# Patient Record
Sex: FEMALE | Race: OTHER | NOT HISPANIC OR LATINO | URBAN - METROPOLITAN AREA
[De-identification: names, ages, dates, MRNs, and addresses within clinical notes are randomized per-mention and may not be internally consistent; named-entity substitution may affect disease eponyms.]

---

## 2020-06-30 ENCOUNTER — OFFICE VISIT (OUTPATIENT)
Dept: URGENT CARE | Facility: CLINIC | Age: 65
End: 2020-06-30
Payer: COMMERCIAL

## 2020-06-30 VITALS
HEART RATE: 96 BPM | TEMPERATURE: 98 F | DIASTOLIC BLOOD PRESSURE: 76 MMHG | SYSTOLIC BLOOD PRESSURE: 142 MMHG | HEIGHT: 60 IN | OXYGEN SATURATION: 97 % | BODY MASS INDEX: 25.52 KG/M2 | WEIGHT: 130 LBS

## 2020-06-30 DIAGNOSIS — Z63.8 POOR ATTACHMENT TO PRIMARY CAREGIVER: ICD-10-CM

## 2020-06-30 DIAGNOSIS — R42 LIGHTHEADEDNESS: ICD-10-CM

## 2020-06-30 DIAGNOSIS — R19.7 DIARRHEA, UNSPECIFIED TYPE: Primary | ICD-10-CM

## 2020-06-30 DIAGNOSIS — R11.0 NAUSEA: ICD-10-CM

## 2020-06-30 PROCEDURE — 99204 OFFICE O/P NEW MOD 45 MIN: CPT | Mod: S$GLB,,, | Performed by: NURSE PRACTITIONER

## 2020-06-30 PROCEDURE — 99204 PR OFFICE/OUTPT VISIT, NEW, LEVL IV, 45-59 MIN: ICD-10-PCS | Mod: S$GLB,,, | Performed by: NURSE PRACTITIONER

## 2020-06-30 RX ORDER — ONDANSETRON 8 MG/1
8 TABLET, ORALLY DISINTEGRATING ORAL EVERY 8 HOURS PRN
Qty: 12 TABLET | Refills: 0 | Status: SHIPPED | OUTPATIENT
Start: 2020-06-30 | End: 2020-07-04

## 2020-06-30 SDOH — SOCIAL DETERMINANTS OF HEALTH (SDOH): OTHER SPECIFIED PROBLEMS RELATED TO PRIMARY SUPPORT GROUP: Z63.8

## 2020-06-30 NOTE — PROGRESS NOTES
"Subjective:       Patient ID: Kylah Leach is a 64 y.o. female.    Vitals:  height is 5' (1.524 m) and weight is 59 kg (130 lb). Her temperature is 98.1 °F (36.7 °C). Her blood pressure is 142/76 (abnormal) and her pulse is 96. Her oxygen saturation is 97%.     Chief Complaint: Hypertension and Diarrhea    Pt presented with c/o diarrhea, nausea (since this am), lightheadedness and an elevated BP reading. States she had multiple episodes of watery diarrhea after eating two bowls of clam chowder, a bowl of crawfish etouffee, and drinking a glass of wine. States her BMs are not really soft but do have some consistency. Has not taken anything for diarrhea or increased her fluid intake. Pt states she did go walking for exercise yesterday and ate a boiled egg this morning prior to coming to clinic. Pt denies history of hypertension but says she is concerned because her blood pressure is "so high".    Hypertension  This is a new problem. The current episode started today. The problem is unchanged. Pertinent negatives include no anxiety, blurred vision, chest pain, headaches, malaise/fatigue, neck pain, orthopnea, palpitations, peripheral edema, PND, shortness of breath or sweats. There are no associated agents to hypertension. There are no known risk factors for coronary artery disease. Past treatments include nothing.   Diarrhea   This is a new problem. The current episode started in the past 7 days (2 days). The problem occurs 2 to 4 times per day. The problem has been unchanged. The stool consistency is described as watery. The patient states that diarrhea awakens her from sleep. Pertinent negatives include no abdominal pain, arthralgias, bloating, chills, coughing, fever, headaches, increased  flatus, myalgias, sweats, URI, vomiting or weight loss. Nothing aggravates the symptoms. There are no known risk factors. She has tried nothing for the symptoms.       Constitution: Negative for chills, fatigue and fever. "   HENT: Negative for congestion and sore throat.    Neck: Negative for neck pain and painful lymph nodes.   Cardiovascular: Negative for chest pain, leg swelling and palpitations.   Eyes: Negative for double vision and blurred vision.   Respiratory: Negative for cough and shortness of breath.    Gastrointestinal: Positive for nausea and diarrhea. Negative for abdominal pain and vomiting.   Endocrine: negative.   Genitourinary: Negative for dysuria, frequency, urgency and history of kidney stones.   Musculoskeletal: Negative for joint pain, joint swelling, muscle cramps and muscle ache.   Skin: Negative for color change, pale, rash and bruising.   Allergic/Immunologic: Negative for seasonal allergies.   Neurological: Positive for dizziness and light-headedness. Negative for history of vertigo, passing out and headaches.   Hematologic/Lymphatic: Negative for swollen lymph nodes.   Psychiatric/Behavioral: Negative for nervous/anxious, sleep disturbance and depression. The patient is not nervous/anxious.        Objective:      Physical Exam   Constitutional: She is oriented to person, place, and time. She appears well-developed.   HENT:   Head: Normocephalic and atraumatic.   Right Ear: External ear normal.   Left Ear: External ear normal.   Nose: Nose normal.   Mouth/Throat: Mucous membranes are normal.   Eyes: Conjunctivae and lids are normal.   Neck: Trachea normal and full passive range of motion without pain. Neck supple.   Cardiovascular: Normal rate, regular rhythm and normal heart sounds.   Pulmonary/Chest: Effort normal and breath sounds normal. No respiratory distress.   Abdominal: Soft. Normal appearance. She exhibits no distension, no abdominal bruit, no pulsatile midline mass and no mass. Bowel sounds are increased. There is no abdominal tenderness. There is no guarding.   Musculoskeletal: Normal range of motion.   Neurological: She is alert and oriented to person, place, and time. She has normal strength.    Skin: Skin is warm, dry, intact, not diaphoretic and not pale.   Psychiatric: Her speech is normal and behavior is normal. Judgment and thought content normal.   Nursing note and vitals reviewed.        Assessment:       1. Diarrhea, unspecified type    2. Nausea    3. Lightheadedness    4. Poor attachment to primary caregiver        Plan:         Diarrhea, unspecified type    Nausea  -     ondansetron (ZOFRAN-ODT) 8 MG TbDL; Take 1 tablet (8 mg total) by mouth every 8 (eight) hours as needed (nausea and vomiting).  Dispense: 12 tablet; Refill: 0    Lightheadedness    Poor attachment to primary caregiver  -     Ambulatory referral/consult to Internal Medicine         MDM: Advised pt that her current BP reading is not an urgent reading and could possibly be managed with lifestyle changes such as diet and exercise. Encouraged pt to follow up with her PCP but she admitted she does not have one. Referral to internal medicine placed so patient can establish care.    Follow up with internal medicine referral  · Ensure that you are maintaining adequate hydration. Alternate between water and an electrolyte replacement beverage (gatorade/powerade/pedialyte).   · Eat a bland diet as tolerated. Avoid spicy foods, dairy, and caffeineated food and beverages.   · Take tylenol for fever and body aches.  · Take nausea medication as prescribed.   · Go to the ER for any severe abdominal pain, inability to tolerate liquids despite nausea medication, or for any pain to the right lower section of your abdomen.   · Follow up with PCP if symptoms don't improve or if nausea and vomiting or diarrhea greater than 7 days.

## 2020-06-30 NOTE — PATIENT INSTRUCTIONS
· Ensure that you are maintaining adequate hydration. Alternate between water and an electrolyte replacement beverage (gatorade/powerade/pedialyte).   · Eat a bland diet as tolerated. Avoid spicy foods, dairy, and caffeineated food and beverages.   · Take tylenol for fever and body aches.  · Take nausea medication as prescribed.   · Go to the ER for any severe abdominal pain, inability to tolerate liquids despite nausea medication, or for any pain to the right lower section of your abdomen.   · Follow up with PCP if symptoms don't improve or if nausea and vomiting or diarrhea greater than 7 days.    Diet for Vomiting or Diarrhea (Adult)    Your symptoms may return or get worse after eating certain foods listed below. If this happens, stop eating these foods until your symptoms ease and you feel better.  Once the vomiting stops, follow the steps below.   During the first 12 to 24 hours  During the first 12 to 24 hours, follow this diet:  · Drinks. Plain water, sport drinks like electrolyte solutions, soft drinks without caffeine, mineral water (plain or flavored), clear fruit juices, and decaffeinated tea and coffee.  · Soups. Clear broth.  · Desserts. Plain gelatin, popsicles, and fruit juice bars. As you feel better, you may add 6 to 8 ounces of yogurt per day. If you have diarrhea, don't have foods or drinks that contain sugar, high-fructose corn syrup, or sugar alcohols.  During the next 24 hours  During the next 24 hours you may add the following to the above:  · Hot cereal, plain toast, bread, rolls, and crackers  · Plain noodles, rice, mashed potatoes, and chicken noodle or rice soup  · Unsweetened canned fruit (but not pineapple) and bananas  Don't eat more than 15 grams of fat a day. Do this by staying away from margarine, butter, oils, mayonnaise, sauces, gravies, fried foods, peanut butter, meat, poultry, and fish.  Don't eat much fiber. Stay away from raw or cooked vegetables, fresh fruits (except  bananas), and bran cereals.  Limit how much caffeine and chocolate you have. Do not use any spices or seasonings except salt.  During the next 24 hours  Slowly go back to your normal diet, as you feel better and your symptoms ease.  Date Last Reviewed: 8/1/2016  © 2038-8618 [employer]. [address] [place] [address], [place] [id number]. All rights reserved. This information is not intended as a substitute for professional medical care. Always follow your healthcare professional's instructions.

## 2020-07-02 ENCOUNTER — TELEPHONE (OUTPATIENT)
Dept: URGENT CARE | Facility: CLINIC | Age: 65
End: 2020-07-02

## 2020-07-02 NOTE — TELEPHONE ENCOUNTER
Courtesy call performed. Patient states she is feeling much better. She also states she is going to set up a PCP with one of our [facility] physicians.

## 2021-07-23 ENCOUNTER — CLINICAL SUPPORT (OUTPATIENT)
Dept: URGENT CARE | Facility: CLINIC | Age: 66
End: 2021-07-23
Payer: COMMERCIAL

## 2021-07-23 DIAGNOSIS — Z11.52 ENCOUNTER FOR SCREENING FOR SEVERE ACUTE RESPIRATORY SYNDROME CORONAVIRUS 2 (SARS-COV-2) INFECTION: Primary | ICD-10-CM

## 2021-07-23 LAB
CTP QC/QA: YES
SARS-COV-2 RDRP RESP QL NAA+PROBE: NEGATIVE

## 2021-07-23 PROCEDURE — U0002: ICD-10-PCS | Mod: QW,S$GLB,, | Performed by: PHYSICIAN ASSISTANT

## 2021-07-23 PROCEDURE — U0002 COVID-19 LAB TEST NON-CDC: HCPCS | Mod: QW,S$GLB,, | Performed by: PHYSICIAN ASSISTANT

## 2023-05-31 ENCOUNTER — PATIENT MESSAGE (OUTPATIENT)
Dept: RESEARCH | Facility: HOSPITAL | Age: 68
End: 2023-05-31
Payer: COMMERCIAL

## 2023-10-17 ENCOUNTER — HOSPITAL ENCOUNTER (EMERGENCY)
Facility: OTHER | Age: 68
Discharge: HOME | End: 2023-10-17
Attending: EMERGENCY MEDICINE
Payer: COMMERCIAL

## 2023-10-17 VITALS
HEIGHT: 62 IN | HEART RATE: 81 BPM | TEMPERATURE: 98 F | OXYGEN SATURATION: 97 % | SYSTOLIC BLOOD PRESSURE: 144 MMHG | WEIGHT: 120 LBS | DIASTOLIC BLOOD PRESSURE: 74 MMHG | BODY MASS INDEX: 22.08 KG/M2 | RESPIRATION RATE: 16 BRPM

## 2023-10-17 DIAGNOSIS — N23 RENAL COLIC ON LEFT SIDE: Primary | ICD-10-CM

## 2023-10-17 DIAGNOSIS — N20.0 NEPHROLITHIASIS: ICD-10-CM

## 2023-10-17 LAB
ALBUMIN SERPL BCP-MCNC: 4.1 G/DL (ref 3.5–5.2)
ALP SERPL-CCNC: 115 U/L (ref 55–135)
ALT SERPL W/O P-5'-P-CCNC: 27 U/L (ref 10–44)
ANION GAP SERPL CALC-SCNC: 9 MMOL/L (ref 8–16)
AST SERPL-CCNC: 23 U/L (ref 10–40)
BACTERIA #/AREA URNS HPF: ABNORMAL /HPF
BASOPHILS # BLD AUTO: 0.03 K/UL (ref 0–0.2)
BASOPHILS NFR BLD: 0.2 % (ref 0–1.9)
BILIRUB SERPL-MCNC: 0.3 MG/DL (ref 0.1–1)
BILIRUB UR QL STRIP: NEGATIVE
BUN SERPL-MCNC: 14 MG/DL (ref 8–23)
CALCIUM SERPL-MCNC: 10.2 MG/DL (ref 8.7–10.5)
CHLORIDE SERPL-SCNC: 105 MMOL/L (ref 95–110)
CLARITY UR: CLEAR
CO2 SERPL-SCNC: 26 MMOL/L (ref 23–29)
COLOR UR: YELLOW
CREAT SERPL-MCNC: 1 MG/DL (ref 0.5–1.4)
DIFFERENTIAL METHOD: ABNORMAL
EOSINOPHIL # BLD AUTO: 0.1 K/UL (ref 0–0.5)
EOSINOPHIL NFR BLD: 0.4 % (ref 0–8)
ERYTHROCYTE [DISTWIDTH] IN BLOOD BY AUTOMATED COUNT: 12.8 % (ref 11.5–14.5)
EST. GFR  (NO RACE VARIABLE): >60 ML/MIN/1.73 M^2
GLUCOSE SERPL-MCNC: 241 MG/DL (ref 70–110)
GLUCOSE UR QL STRIP: ABNORMAL
HCT VFR BLD AUTO: 40 % (ref 37–48.5)
HCV AB SERPL QL IA: NEGATIVE
HGB BLD-MCNC: 13.5 G/DL (ref 12–16)
HGB UR QL STRIP: ABNORMAL
HIV 1+2 AB+HIV1 P24 AG SERPL QL IA: NEGATIVE
IMM GRANULOCYTES # BLD AUTO: 0.07 K/UL (ref 0–0.04)
IMM GRANULOCYTES NFR BLD AUTO: 0.5 % (ref 0–0.5)
KETONES UR QL STRIP: NEGATIVE
LEUKOCYTE ESTERASE UR QL STRIP: ABNORMAL
LYMPHOCYTES # BLD AUTO: 1.7 K/UL (ref 1–4.8)
LYMPHOCYTES NFR BLD: 11.6 % (ref 18–48)
MCH RBC QN AUTO: 29.1 PG (ref 27–31)
MCHC RBC AUTO-ENTMCNC: 33.8 G/DL (ref 32–36)
MCV RBC AUTO: 86 FL (ref 82–98)
MICROSCOPIC COMMENT: ABNORMAL
MONOCYTES # BLD AUTO: 0.5 K/UL (ref 0.3–1)
MONOCYTES NFR BLD: 3.6 % (ref 4–15)
NEUTROPHILS # BLD AUTO: 11.9 K/UL (ref 1.8–7.7)
NEUTROPHILS NFR BLD: 83.7 % (ref 38–73)
NITRITE UR QL STRIP: NEGATIVE
NRBC BLD-RTO: 0 /100 WBC
PH UR STRIP: 7 [PH] (ref 5–8)
PLATELET # BLD AUTO: 329 K/UL (ref 150–450)
PMV BLD AUTO: 9 FL (ref 9.2–12.9)
POTASSIUM SERPL-SCNC: 4.2 MMOL/L (ref 3.5–5.1)
PROT SERPL-MCNC: 8 G/DL (ref 6–8.4)
PROT UR QL STRIP: NEGATIVE
RBC # BLD AUTO: 4.64 M/UL (ref 4–5.4)
RBC #/AREA URNS HPF: 10 /HPF (ref 0–4)
SODIUM SERPL-SCNC: 140 MMOL/L (ref 136–145)
SP GR UR STRIP: 1.01 (ref 1–1.03)
SQUAMOUS #/AREA URNS HPF: 5 /HPF
URN SPEC COLLECT METH UR: ABNORMAL
UROBILINOGEN UR STRIP-ACNC: NEGATIVE EU/DL
WBC # BLD AUTO: 14.25 K/UL (ref 3.9–12.7)
WBC #/AREA URNS HPF: 11 /HPF (ref 0–5)

## 2023-10-17 PROCEDURE — 63600175 PHARM REV CODE 636 W HCPCS: Performed by: PHYSICIAN ASSISTANT

## 2023-10-17 PROCEDURE — 86803 HEPATITIS C AB TEST: CPT | Performed by: PHYSICIAN ASSISTANT

## 2023-10-17 PROCEDURE — 87086 URINE CULTURE/COLONY COUNT: CPT | Performed by: PHYSICIAN ASSISTANT

## 2023-10-17 PROCEDURE — 96375 TX/PRO/DX INJ NEW DRUG ADDON: CPT

## 2023-10-17 PROCEDURE — 25000003 PHARM REV CODE 250: Performed by: PHYSICIAN ASSISTANT

## 2023-10-17 PROCEDURE — 85025 COMPLETE CBC W/AUTO DIFF WBC: CPT | Performed by: PHYSICIAN ASSISTANT

## 2023-10-17 PROCEDURE — 87088 URINE BACTERIA CULTURE: CPT | Performed by: PHYSICIAN ASSISTANT

## 2023-10-17 PROCEDURE — 99285 EMERGENCY DEPT VISIT HI MDM: CPT | Mod: 25

## 2023-10-17 PROCEDURE — 96374 THER/PROPH/DIAG INJ IV PUSH: CPT

## 2023-10-17 PROCEDURE — 87389 HIV-1 AG W/HIV-1&-2 AB AG IA: CPT | Performed by: PHYSICIAN ASSISTANT

## 2023-10-17 PROCEDURE — 80053 COMPREHEN METABOLIC PANEL: CPT | Performed by: PHYSICIAN ASSISTANT

## 2023-10-17 PROCEDURE — 81000 URINALYSIS NONAUTO W/SCOPE: CPT | Performed by: PHYSICIAN ASSISTANT

## 2023-10-17 PROCEDURE — 96361 HYDRATE IV INFUSION ADD-ON: CPT

## 2023-10-17 RX ORDER — HYDROCODONE BITARTRATE AND ACETAMINOPHEN 5; 325 MG/1; MG/1
1 TABLET ORAL
Status: COMPLETED | OUTPATIENT
Start: 2023-10-17 | End: 2023-10-17

## 2023-10-17 RX ORDER — DOCUSATE SODIUM 100 MG/1
100 CAPSULE, LIQUID FILLED ORAL DAILY
Status: DISCONTINUED | OUTPATIENT
Start: 2023-10-17 | End: 2023-10-18 | Stop reason: HOSPADM

## 2023-10-17 RX ORDER — HYDROCODONE BITARTRATE AND ACETAMINOPHEN 5; 325 MG/1; MG/1
1 TABLET ORAL EVERY 6 HOURS PRN
Qty: 12 TABLET | Refills: 0 | Status: SHIPPED | OUTPATIENT
Start: 2023-10-17

## 2023-10-17 RX ORDER — CIPROFLOXACIN 500 MG/1
500 TABLET ORAL
Status: COMPLETED | OUTPATIENT
Start: 2023-10-17 | End: 2023-10-17

## 2023-10-17 RX ORDER — ONDANSETRON 2 MG/ML
4 INJECTION INTRAMUSCULAR; INTRAVENOUS
Status: COMPLETED | OUTPATIENT
Start: 2023-10-17 | End: 2023-10-17

## 2023-10-17 RX ORDER — KETOROLAC TROMETHAMINE 30 MG/ML
10 INJECTION, SOLUTION INTRAMUSCULAR; INTRAVENOUS
Status: COMPLETED | OUTPATIENT
Start: 2023-10-17 | End: 2023-10-17

## 2023-10-17 RX ORDER — KETOROLAC TROMETHAMINE 10 MG/1
10 TABLET, FILM COATED ORAL EVERY 6 HOURS
Qty: 12 TABLET | Refills: 0 | Status: SHIPPED | OUTPATIENT
Start: 2023-10-17 | End: 2023-10-20

## 2023-10-17 RX ORDER — DOCUSATE SODIUM 100 MG/1
100 CAPSULE, LIQUID FILLED ORAL DAILY
Status: DISCONTINUED | OUTPATIENT
Start: 2023-10-18 | End: 2023-10-17

## 2023-10-17 RX ORDER — ONDANSETRON 4 MG/1
4 TABLET, ORALLY DISINTEGRATING ORAL EVERY 8 HOURS PRN
Qty: 30 TABLET | Refills: 0 | Status: SHIPPED | OUTPATIENT
Start: 2023-10-17

## 2023-10-17 RX ORDER — DOCUSATE SODIUM 100 MG/1
100 CAPSULE, LIQUID FILLED ORAL 2 TIMES DAILY PRN
Qty: 60 CAPSULE | Refills: 0 | Status: SHIPPED | OUTPATIENT
Start: 2023-10-17

## 2023-10-17 RX ORDER — CIPROFLOXACIN 500 MG/1
500 TABLET ORAL EVERY 12 HOURS
Qty: 10 TABLET | Refills: 0 | Status: SHIPPED | OUTPATIENT
Start: 2023-10-17 | End: 2023-10-22

## 2023-10-17 RX ADMIN — SODIUM CHLORIDE 1000 ML: 0.9 INJECTION, SOLUTION INTRAVENOUS at 08:10

## 2023-10-17 RX ADMIN — KETOROLAC TROMETHAMINE 10 MG: 30 INJECTION, SOLUTION INTRAMUSCULAR; INTRAVENOUS at 08:10

## 2023-10-17 RX ADMIN — DOCUSATE SODIUM 100 MG: 100 CAPSULE, LIQUID FILLED ORAL at 10:10

## 2023-10-17 RX ADMIN — CIPROFLOXACIN 500 MG: 500 TABLET, FILM COATED ORAL at 10:10

## 2023-10-17 RX ADMIN — ONDANSETRON 4 MG: 2 INJECTION INTRAMUSCULAR; INTRAVENOUS at 08:10

## 2023-10-17 RX ADMIN — HYDROCODONE BITARTRATE AND ACETAMINOPHEN 1 TABLET: 5; 325 TABLET ORAL at 10:10

## 2023-10-17 NOTE — FIRST PROVIDER EVALUATION
Emergency Department TeleTriage Encounter Note      CHIEF COMPLAINT    Chief Complaint   Patient presents with    Flank Pain     L flank pain with N/V.        VITAL SIGNS   Initial Vitals [10/17/23 1722]   BP Pulse Resp Temp SpO2   (!) 179/89 97 16 98.2 °F (36.8 °C) 99 %      MAP       --            ALLERGIES    Review of patient's allergies indicates:   Allergen Reactions    Penicillins Anaphylaxis       PROVIDER TRIAGE NOTE  Patient has left flank pain, nausea, vomiting that feels like previous renal stones.       ORDERS  Labs Reviewed - No data to display    ED Orders (720h ago, onward)      None              Virtual Visit Note: The provider triage portion of this emergency department evaluation and documentation was performed via [employer], a HIPAA-compliant telemedicine application, in concert with a tele-presenter in the room. A face to face patient evaluation with one of my colleagues will occur once the patient is placed in an emergency department room.      DISCLAIMER: This note was prepared with [employer] voice recognition transcription software. Garbled syntax, mangled pronouns, and other bizarre constructions may be attributed to that software system.

## 2023-10-17 NOTE — ED TRIAGE NOTES
"Pt arrived with c/o L flank pain since about 2pm today.  Pt endorses n/v.  Pt endorses hx of kidney stones and states this feel very similar.  Pt denies any hematuria.  Pt states, "I'm peeing a lot less in comparison to how much water I've been drinking."  Pt reports subjective fever.  Pt answering questions appropriately, speaking in complete sentences, respirations even and unlabored.  Aao x 4.    "
none

## 2023-10-17 NOTE — Clinical Note
"Kylah Thompson" Janki was seen and treated in our emergency department on 10/17/2023.  She may return to work on 10/20/2023.       If you have any questions or concerns, please don't hesitate to call.      Viviane Mckeon PA"

## 2023-10-18 ENCOUNTER — PATIENT MESSAGE (OUTPATIENT)
Dept: EMERGENCY MEDICINE | Facility: OTHER | Age: 68
End: 2023-10-18
Payer: COMMERCIAL

## 2023-10-18 RX ORDER — TAMSULOSIN HYDROCHLORIDE 0.4 MG/1
0.4 CAPSULE ORAL DAILY
Qty: 10 CAPSULE | Refills: 0 | Status: SHIPPED | OUTPATIENT
Start: 2023-10-18 | End: 2024-10-17

## 2023-10-18 NOTE — ED PROVIDER NOTES
Encounter Date: 10/17/2023       History     Chief Complaint   Patient presents with    Flank Pain     L flank pain with N/V.      68 y.o. female presents to the ED c/o severe waxing and waning L flank pain acute onset 1400 this afternoon. Pt reports pain c/w prior kidney stones. Pt has had initial urinary hesitancy since shortly after onset of pain, but denies dysuria or hematuria. Pt states that the pain has been gradually migrating from L flank to LLQ since onset. She began feeling nauseous an hour or 2 after onset of symptoms, vomited at home and again in ED shortly after arriving. Also c/o generalized headache since arriving in ED. 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    The history is provided by the patient.     Review of patient's allergies indicates:   Allergen Reactions    Penicillins Anaphylaxis     Past Medical History:   Diagnosis Date    Kidney stones      Past Surgical History:   Procedure Laterality Date     SECTION       Family History   Problem Relation Age of Onset    Diabetes Mother     Diabetes Father      Social History     Tobacco Use    Smoking status: Never    Smokeless tobacco: Never   Substance Use Topics    Alcohol use: Yes    Drug use: Never     Review of Systems  As per HPI  Physical Exam     Initial Vitals [10/17/23 1722]   BP Pulse Resp Temp SpO2   (!) 179/89 97 16 98.2 °F (36.8 °C) 99 %      MAP       --         Vitals:    10/17/23 2212   BP: (!) 144/74   Pulse: 81   Resp: 16   Temp: 97.6 °F (36.4 °C)       Physical Exam    Constitutional: She appears well-developed and well-nourished. She is not diaphoretic. No distress.   HENT:   Head: Normocephalic and atraumatic.   Eyes: EOM are normal. Pupils are equal, round, and reactive to light.   Neck:   Normal  range of motion.  Cardiovascular:  Normal rate and regular rhythm.           Pulmonary/Chest: No respiratory distress.   Abdominal: Abdomen is soft. She exhibits no distension. There is no abdominal tenderness.   No right CVA tenderness.  There is left CVA tenderness. There is no rebound, no guarding and no tenderness at McBurney's point.   Musculoskeletal:         General: Normal range of motion.      Cervical back: Normal range of motion.     Neurological: She is alert and oriented to person, place, and time. She has normal strength.   Skin: Skin is warm and dry. No pallor.   Psychiatric: She has a normal mood and affect. Thought content normal.         ED Course   Procedures  Labs Reviewed   COMPREHENSIVE METABOLIC PANEL - Abnormal; Notable for the following components:       Result Value    Glucose 241 (*)     All other components within normal limits   CBC W/ AUTO DIFFERENTIAL - Abnormal; Notable for the following components:    WBC 14.25 (*)     MPV 9.0 (*)     Gran # (ANC) 11.9 (*)     Immature Grans (Abs) 0.07 (*)     Gran % 83.7 (*)     Lymph % 11.6 (*)     Mono % 3.6 (*)     All other components within normal limits   URINALYSIS, REFLEX TO URINE CULTURE - Abnormal; Notable for the following components:    Glucose, UA 2+ (*)     Occult Blood UA 2+ (*)     Leukocytes, UA Trace (*)     All other components within normal limits    Narrative:     Specimen Source->Urine   URINALYSIS MICROSCOPIC - Abnormal; Notable for the following components:    RBC, UA 10 (*)     WBC, UA 11 (*)     Bacteria Few (*)     All other components within normal limits    Narrative:     Specimen Source->Urine   CULTURE, URINE   HIV 1 / 2 ANTIBODY    Narrative:     Release to patient->Immediate   HEPATITIS C ANTIBODY    Narrative:     Release to patient->Immediate          Imaging Results              CT Renal Stone Study ABD Pelvis WO (Final result)  Result time 10/17/23 21:09:25      Final result by Shon Pagan MD (10/17/23  21:09:25)                   Impression:      1. Mild left-sided hydronephrosis secondary to small 2 mm left mid ureteral stone.  2. Moderate right-sided hydronephrosis of uncertain etiology.  No definite stones are seen along the right ureteral course.  3. Bilateral nonobstructing renal stones with bilateral staghorn calculi.  4. Additional findings as detailed above.      Electronically signed by: Shon Pagan MD  Date:    10/17/2023  Time:    21:09               Narrative:    EXAMINATION:  CT RENAL STONE STUDY ABD PELVIS WO    CLINICAL HISTORY:  Flank pain, kidney stone suspected;    TECHNIQUE:  Low dose axial images, sagittal and coronal reformations were obtained from the lung bases to the pubic symphysis.  Contrast was not administered.    COMPARISON:  None    FINDINGS:  The visualized portion of the heart is unremarkable.  Minimal bibasilar atelectatic changes are seen with no focal consolidation or pleural effusion.    No significant hepatic abnormality seen on this noncontrast exam.  There is no intra-or extrahepatic biliary ductal dilatation.  The gallbladder is unremarkable.  There is a small hiatal hernia.  Stomach is otherwise normal in appearance.  Pancreas, spleen, and adrenal glands are unremarkable.    Numerous bilateral nonobstructing stones are seen and additional bilateral staghorn calculi.  There is moderate right-sided hydronephrosis with no definite right-sided ureteral stone appreciated.  There is mild left-sided hydronephrosis secondary to small 2 mm stone in the left mid ureter.  Urinary bladder is nondistended.  Uterus is unremarkable.    Appendix is visualized and is unremarkable.  The visualized loops of small and large bowel show no evidence of obstruction or inflammation.  No free air or free fluid.    Aorta tapers normally.    No acute osseous abnormality identified. Subcutaneous soft tissues show no significant abnormalities.                                       Medications    ondansetron injection 4 mg (4 mg Intravenous Given 10/17/23 2024)   sodium chloride 0.9% bolus 1,000 mL 1,000 mL (0 mLs Intravenous Stopped 10/17/23 2140)   ketorolac injection 9.999 mg (9.999 mg Intravenous Given 10/17/23 2024)   HYDROcodone-acetaminophen 5-325 mg per tablet 1 tablet (1 tablet Oral Given 10/17/23 2210)   ciprofloxacin HCl tablet 500 mg (500 mg Oral Given 10/17/23 2210)     Medical Decision Making  Amount and/or Complexity of Data Reviewed  Labs: ordered.  Radiology: ordered.    Risk  OTC drugs.  Prescription drug management.                          Medical Decision Making:   History:   Old Medical Records: I decided to obtain old medical records.  Old Records Summarized: records from clinic visits.  Initial Assessment:   Emergent evaluation of 68-year-old female presenting with acute left-sided flank pain.  Does report history of kidney stones.  Not currently following with Urology.  Reports some initial urinary hesitancy.  Pain associated with nausea, vomiting gradual onset of headache.  Reports pain is colicky in nature.  Has since had some improvement upon arrival to room.  Denies any dysuria hematuria.  She appears well in no significant distress on my initial interview.  She would have some CVA tenderness however on repeat this has resolved.  Remaining exam unremarkable  Differential Diagnosis:   Differential Diagnosis includes, but is not limited to:  AAA, aortic dissection, SBO/volvulus, intussusception, ileus, appendicitis, cholecystitis, hepatitis, nephrolithiasis, pancreatitis, IBD/IBS, biliary colic, GERD, PUD, constipation, UTI/pyelonephritis, musculoskeletal pain.       Clinical Tests:   Lab Tests: Reviewed and Ordered  Radiological Study: Reviewed and Ordered  ED Management:  Patient seen in tele triage process were initial labs were ordered.  Given patient's previous history of kidney stones in urinary findings today will obtain renal CT.  Labs notable for mild leukocytosis at 14.   Urine with skin cells however noted trace leukocytes and few bacteria.  She denies any dysuria however given the mild leukocytosis and this finding felt reasonable to initiate antibiotics given the CT findings of nephrolithiasis.  Noted bilateral staghorn kidney stones and some bilateral hydronephrosis with right more prevalent.  Discussed possibility of passed stone although she had no recent flank pain.  Maybe related to which she describes as possible renal stent in the past with some scarring.  She is urinating and had improvement pain with IV fluids and Toradol.  Continues with mild headache and as it is late in the night and pharmacy is closed will give hydrocodone tonight and sent home with symptomatic medications.  Strict instructions to follow up with reference provided for further assessment and evaluation. Given instructions to return for any acute symptoms and verbalized understanding of this medical plan.    Upon review of medication discharge noted I did not sent home with Flomax and will send home with this medication as well      Clinical Impression:   Final diagnoses:  [N23] Renal colic on left side (Primary)  [N20.0] Nephrolithiasis        ED Disposition Condition    Discharge Stable          ED Prescriptions       Medication Sig Dispense Start Date End Date Auth. Provider    ciprofloxacin HCl (CIPRO) 500 MG tablet Take 1 tablet (500 mg total) by mouth every 12 (twelve) hours. for 5 days 10 tablet 10/17/2023 10/22/2023 Viviane Mckeon PA    ketorolac (TORADOL) 10 mg tablet Take 1 tablet (10 mg total) by mouth every 6 (six) hours. for 3 days 12 tablet 10/17/2023 10/20/2023 Viviane Mckeon PA    ondansetron (ZOFRAN-ODT) 4 MG TbDL Take 1 tablet (4 mg total) by mouth every 8 (eight) hours as needed. 30 tablet 10/17/2023 -- Viviane Mckeon PA    HYDROcodone-acetaminophen (NORCO) 5-325 mg per tablet Take 1 tablet by mouth every 6 (six) hours as needed for Pain. 12 tablet 10/17/2023 --  Viviane Mckeon PA    docusate sodium (COLACE) 100 MG capsule Take 1 capsule (100 mg total) by mouth 2 (two) times daily as needed for Constipation. 60 capsule 10/17/2023 -- Viviane Mckeon PA          Follow-up Information       Follow up With Specialties Details Why Contact Info Additional Information    [facility] Urology Schedule an appointment as soon as possible for a visit   [address] [place] [address], [address]  [facility] [id number] [phone] Urology - [facility], 6th Floor, [address] Patients seeing Dr. Gutierrez, please check in at [facility] [address]. Please park in the [place]. Please come with a full bladder to in office visit to provide a urine specimen when you arrive.    Shirley Castro NP Urology Schedule an appointment as soon as possible for a visit   [address] [place] [id number]  [phone]                Viviane Mckeon PA  10/18/23 1050

## 2023-10-18 NOTE — DISCHARGE INSTRUCTIONS
Can take a fiber gummy daily to help with constipation.  Please start with anti-inflammatory medication (Toradol) should pain continue can take Norco.  If you began this medication please began Colace to help with constipation side effects

## 2023-10-19 LAB — BACTERIA UR CULT: ABNORMAL

## 2024-10-02 ENCOUNTER — PATIENT MESSAGE (OUTPATIENT)
Dept: RESEARCH | Facility: CLINIC | Age: 69
End: 2024-10-02
Payer: COMMERCIAL

## 2025-05-13 ENCOUNTER — HOSPITAL ENCOUNTER (OUTPATIENT)
Facility: OTHER | Age: 70
Discharge: HOME | End: 2025-05-15
Attending: STUDENT IN AN ORGANIZED HEALTH CARE EDUCATION/TRAINING PROGRAM | Admitting: HOSPITALIST
Payer: COMMERCIAL

## 2025-05-13 DIAGNOSIS — R07.9 CHEST PAIN: ICD-10-CM

## 2025-05-13 DIAGNOSIS — N13.30 HYDRONEPHROSIS, UNSPECIFIED HYDRONEPHROSIS TYPE: ICD-10-CM

## 2025-05-13 DIAGNOSIS — N20.0 NEPHROLITHIASIS: ICD-10-CM

## 2025-05-13 DIAGNOSIS — N20.1 URETEROLITHIASIS: Primary | ICD-10-CM

## 2025-05-13 DIAGNOSIS — R73.9 HYPERGLYCEMIA: ICD-10-CM

## 2025-05-13 DIAGNOSIS — E11.65 TYPE 2 DIABETES MELLITUS WITH HYPERGLYCEMIA, WITHOUT LONG-TERM CURRENT USE OF INSULIN: ICD-10-CM

## 2025-05-13 DIAGNOSIS — R10.9 LEFT FLANK PAIN: ICD-10-CM

## 2025-05-13 DIAGNOSIS — N20.1 OBSTRUCTION OF LEFT URETEROPELVIC JUNCTION (UPJ) DUE TO STONE: ICD-10-CM

## 2025-05-13 LAB
ABSOLUTE EOSINOPHIL (OHS): 0.19 K/UL
ABSOLUTE MONOCYTE (OHS): 0.57 K/UL (ref 0.3–1)
ABSOLUTE NEUTROPHIL COUNT (OHS): 5.13 K/UL (ref 1.8–7.7)
ALBUMIN SERPL BCP-MCNC: 4.1 G/DL (ref 3.5–5.2)
ALP SERPL-CCNC: 113 UNIT/L (ref 40–150)
ALT SERPL W/O P-5'-P-CCNC: 38 UNIT/L (ref 10–44)
ANION GAP (OHS): 13 MMOL/L (ref 8–16)
AST SERPL-CCNC: 42 UNIT/L (ref 11–45)
BACTERIA #/AREA URNS AUTO: ABNORMAL /HPF
BASOPHILS # BLD AUTO: 0.06 K/UL
BASOPHILS NFR BLD AUTO: 0.7 %
BILIRUB SERPL-MCNC: 0.2 MG/DL (ref 0.1–1)
BILIRUB UR QL STRIP.AUTO: NEGATIVE
BUN SERPL-MCNC: 19 MG/DL (ref 8–23)
CALCIUM SERPL-MCNC: 10.2 MG/DL (ref 8.7–10.5)
CHLORIDE SERPL-SCNC: 106 MMOL/L (ref 95–110)
CLARITY UR: CLEAR
CO2 SERPL-SCNC: 18 MMOL/L (ref 23–29)
COLOR UR AUTO: YELLOW
CREAT SERPL-MCNC: 1.1 MG/DL (ref 0.5–1.4)
ERYTHROCYTE [DISTWIDTH] IN BLOOD BY AUTOMATED COUNT: 12.5 % (ref 11.5–14.5)
GFR SERPLBLD CREATININE-BSD FMLA CKD-EPI: 55 ML/MIN/1.73/M2
GLUCOSE SERPL-MCNC: 291 MG/DL (ref 70–110)
GLUCOSE UR QL STRIP: ABNORMAL
HCT VFR BLD AUTO: 40.2 % (ref 37–48.5)
HGB BLD-MCNC: 13.5 GM/DL (ref 12–16)
HGB UR QL STRIP: ABNORMAL
HOLD SPECIMEN: NORMAL
IMM GRANULOCYTES # BLD AUTO: 0.12 K/UL (ref 0–0.04)
IMM GRANULOCYTES NFR BLD AUTO: 1.3 % (ref 0–0.5)
KETONES UR QL STRIP: NEGATIVE
LEUKOCYTE ESTERASE UR QL STRIP: ABNORMAL
LYMPHOCYTES # BLD AUTO: 3.01 K/UL (ref 1–4.8)
MCH RBC QN AUTO: 29.7 PG (ref 27–31)
MCHC RBC AUTO-ENTMCNC: 33.6 G/DL (ref 32–36)
MCV RBC AUTO: 88 FL (ref 82–98)
MICROSCOPIC COMMENT: ABNORMAL
NITRITE UR QL STRIP: NEGATIVE
NUCLEATED RBC (/100WBC) (OHS): 0 /100 WBC
PH UR STRIP: 6 [PH]
PLATELET # BLD AUTO: 336 K/UL (ref 150–450)
PMV BLD AUTO: 9.1 FL (ref 9.2–12.9)
POTASSIUM SERPL-SCNC: 5.2 MMOL/L (ref 3.5–5.1)
PROT SERPL-MCNC: 8.7 GM/DL (ref 6–8.4)
PROT UR QL STRIP: NEGATIVE
RBC # BLD AUTO: 4.55 M/UL (ref 4–5.4)
RBC #/AREA URNS AUTO: 58 /HPF (ref 0–4)
RELATIVE EOSINOPHIL (OHS): 2.1 %
RELATIVE LYMPHOCYTE (OHS): 33.1 % (ref 18–48)
RELATIVE MONOCYTE (OHS): 6.3 % (ref 4–15)
RELATIVE NEUTROPHIL (OHS): 56.5 % (ref 38–73)
SODIUM SERPL-SCNC: 137 MMOL/L (ref 136–145)
SP GR UR STRIP: 1.01
SQUAMOUS #/AREA URNS AUTO: 1 /HPF
UROBILINOGEN UR STRIP-ACNC: NEGATIVE EU/DL
WBC # BLD AUTO: 9.08 K/UL (ref 3.9–12.7)
WBC #/AREA URNS AUTO: 22 /HPF (ref 0–5)
YEAST UR QL AUTO: ABNORMAL /HPF

## 2025-05-13 PROCEDURE — 99285 EMERGENCY DEPT VISIT HI MDM: CPT | Mod: 25

## 2025-05-13 PROCEDURE — 85025 COMPLETE CBC W/AUTO DIFF WBC: CPT

## 2025-05-13 PROCEDURE — G0378 HOSPITAL OBSERVATION PER HR: HCPCS

## 2025-05-13 PROCEDURE — 25000003 PHARM REV CODE 250

## 2025-05-13 PROCEDURE — 63600175 PHARM REV CODE 636 W HCPCS: Performed by: NURSE PRACTITIONER

## 2025-05-13 PROCEDURE — 96374 THER/PROPH/DIAG INJ IV PUSH: CPT | Mod: 59

## 2025-05-13 PROCEDURE — 96376 TX/PRO/DX INJ SAME DRUG ADON: CPT

## 2025-05-13 PROCEDURE — 96375 TX/PRO/DX INJ NEW DRUG ADDON: CPT

## 2025-05-13 PROCEDURE — 99284 EMERGENCY DEPT VISIT MOD MDM: CPT | Mod: ,,, | Performed by: UROLOGY

## 2025-05-13 PROCEDURE — 96361 HYDRATE IV INFUSION ADD-ON: CPT

## 2025-05-13 PROCEDURE — 80053 COMPREHEN METABOLIC PANEL: CPT

## 2025-05-13 PROCEDURE — 96374 THER/PROPH/DIAG INJ IV PUSH: CPT

## 2025-05-13 PROCEDURE — 87086 URINE CULTURE/COLONY COUNT: CPT

## 2025-05-13 PROCEDURE — 25000003 PHARM REV CODE 250: Performed by: NURSE PRACTITIONER

## 2025-05-13 PROCEDURE — 81001 URINALYSIS AUTO W/SCOPE: CPT

## 2025-05-13 PROCEDURE — 63600175 PHARM REV CODE 636 W HCPCS

## 2025-05-13 RX ORDER — MORPHINE SULFATE 4 MG/ML
4 INJECTION, SOLUTION INTRAMUSCULAR; INTRAVENOUS
Refills: 0 | Status: COMPLETED | OUTPATIENT
Start: 2025-05-13 | End: 2025-05-13

## 2025-05-13 RX ORDER — TALC
6 POWDER (GRAM) TOPICAL NIGHTLY PRN
Status: DISCONTINUED | OUTPATIENT
Start: 2025-05-13 | End: 2025-05-15 | Stop reason: HOSPADM

## 2025-05-13 RX ORDER — KETOROLAC TROMETHAMINE 30 MG/ML
10 INJECTION, SOLUTION INTRAMUSCULAR; INTRAVENOUS EVERY 6 HOURS PRN
Status: DISCONTINUED | OUTPATIENT
Start: 2025-05-13 | End: 2025-05-13

## 2025-05-13 RX ORDER — MORPHINE SULFATE 4 MG/ML
4 INJECTION, SOLUTION INTRAMUSCULAR; INTRAVENOUS EVERY 6 HOURS PRN
Status: DISCONTINUED | OUTPATIENT
Start: 2025-05-13 | End: 2025-05-13

## 2025-05-13 RX ORDER — SODIUM CHLORIDE 9 MG/ML
INJECTION, SOLUTION INTRAVENOUS CONTINUOUS
Status: DISCONTINUED | OUTPATIENT
Start: 2025-05-13 | End: 2025-05-14

## 2025-05-13 RX ORDER — ACETAMINOPHEN 325 MG/1
650 TABLET ORAL EVERY 8 HOURS PRN
Status: DISCONTINUED | OUTPATIENT
Start: 2025-05-13 | End: 2025-05-15 | Stop reason: HOSPADM

## 2025-05-13 RX ORDER — SODIUM CHLORIDE 0.9 % (FLUSH) 0.9 %
10 SYRINGE (ML) INJECTION EVERY 8 HOURS PRN
Status: DISCONTINUED | OUTPATIENT
Start: 2025-05-13 | End: 2025-05-14

## 2025-05-13 RX ORDER — KETOROLAC TROMETHAMINE 30 MG/ML
15 INJECTION, SOLUTION INTRAMUSCULAR; INTRAVENOUS
Status: COMPLETED | OUTPATIENT
Start: 2025-05-13 | End: 2025-05-13

## 2025-05-13 RX ORDER — KETOROLAC TROMETHAMINE 30 MG/ML
15 INJECTION, SOLUTION INTRAMUSCULAR; INTRAVENOUS EVERY 6 HOURS PRN
Status: DISCONTINUED | OUTPATIENT
Start: 2025-05-13 | End: 2025-05-15 | Stop reason: HOSPADM

## 2025-05-13 RX ORDER — ONDANSETRON HYDROCHLORIDE 2 MG/ML
4 INJECTION, SOLUTION INTRAVENOUS EVERY 6 HOURS PRN
Status: DISCONTINUED | OUTPATIENT
Start: 2025-05-13 | End: 2025-05-15 | Stop reason: HOSPADM

## 2025-05-13 RX ORDER — PROCHLORPERAZINE EDISYLATE 5 MG/ML
5 INJECTION INTRAMUSCULAR; INTRAVENOUS EVERY 6 HOURS PRN
Status: DISCONTINUED | OUTPATIENT
Start: 2025-05-13 | End: 2025-05-15 | Stop reason: HOSPADM

## 2025-05-13 RX ORDER — SODIUM CHLORIDE 0.9 % (FLUSH) 0.9 %
10 SYRINGE (ML) INJECTION
Status: DISCONTINUED | OUTPATIENT
Start: 2025-05-13 | End: 2025-05-13

## 2025-05-13 RX ORDER — MORPHINE SULFATE 4 MG/ML
4 INJECTION, SOLUTION INTRAMUSCULAR; INTRAVENOUS EVERY 4 HOURS PRN
Refills: 0 | Status: DISCONTINUED | OUTPATIENT
Start: 2025-05-13 | End: 2025-05-14

## 2025-05-13 RX ORDER — ONDANSETRON HYDROCHLORIDE 2 MG/ML
4 INJECTION, SOLUTION INTRAVENOUS
Status: COMPLETED | OUTPATIENT
Start: 2025-05-13 | End: 2025-05-13

## 2025-05-13 RX ORDER — NALOXONE HCL 0.4 MG/ML
0.02 VIAL (ML) INJECTION
Status: DISCONTINUED | OUTPATIENT
Start: 2025-05-13 | End: 2025-05-15 | Stop reason: HOSPADM

## 2025-05-13 RX ADMIN — MORPHINE SULFATE 4 MG: 4 INJECTION INTRAVENOUS at 12:05

## 2025-05-13 RX ADMIN — MORPHINE SULFATE 4 MG: 4 INJECTION INTRAVENOUS at 11:05

## 2025-05-13 RX ADMIN — SODIUM CHLORIDE 1000 ML: 9 INJECTION, SOLUTION INTRAVENOUS at 09:05

## 2025-05-13 RX ADMIN — ONDANSETRON 4 MG: 2 INJECTION INTRAMUSCULAR; INTRAVENOUS at 06:05

## 2025-05-13 RX ADMIN — KETOROLAC TROMETHAMINE 15 MG: 30 INJECTION, SOLUTION INTRAMUSCULAR at 09:05

## 2025-05-13 RX ADMIN — ONDANSETRON 4 MG: 2 INJECTION INTRAMUSCULAR; INTRAVENOUS at 09:05

## 2025-05-13 RX ADMIN — SODIUM CHLORIDE: 9 INJECTION, SOLUTION INTRAVENOUS at 06:05

## 2025-05-13 NOTE — HPI
Kylah Shearer is a 69 year old female with a past medical history of kidney stones who presents with left flank pain that started earlier today. Patient states pain continued to worsen throughout the day prompting her to come to the ED. She reports hx of kidney stones with several procedures in the past (ureteroscopy). Denies prior PCNL. Denies procedure since last CT 10/2023.   She reports associated vomiting with the pain. Patient denies fever, chills and any urinary symptoms.     ED work up significant for CT renal stone study that showed bilateral staghorn calculi with 8mm L UPJ stone. CT findings similar to large stone burden seen on CT scan in 10/2023 except for the new UPJ stone. Patient received IV pain medication and urology was consulted. Patient was evaluated by urology and IR consulted. Patient referred to hospital medicine and will be admitted for further evaluation and management.

## 2025-05-13 NOTE — ED NOTES
Pt to ED with acute onset L flank pain approx. 20 min PTA, Hx of kidney stones, states pain feels similar to prior episodes. Denies hematuria, dysuria. Appears uncomfortable, diaphoretic. ED provider at bedside.

## 2025-05-13 NOTE — ED PROVIDER NOTES
"Encounter Date: 2025       History     Chief Complaint   Patient presents with    Flank Pain     L flank pain x20 min PTA, Hx of kidney stones.      Kylah Shearer is a 69 y.o. female with history of kidney stones presenting to the emergency department for evaluation of left flank pain that began approximately 20 minutes prior to arrival today. She describes the pain as "kidney stone pain" stating that it feels similar to previous episodes. She reports associated nausea and vomiting. She denies fever, chills, abdominal pain, diarrhea, urinary retention, hematuria or dysuria. She has not taken any medication for her pain prior to arrival. Notes that her last episode of kidney stones was in 2024. She does not have a urologist in [place].       The history is provided by the patient.     Review of patient's allergies indicates:   Allergen Reactions    Penicillins Anaphylaxis     Past Medical History:   Diagnosis Date    Kidney stones      Past Surgical History:   Procedure Laterality Date     SECTION       Family History   Problem Relation Name Age of Onset    Diabetes Mother      Diabetes Father       Social History[1]    Review of Systems  As per HPI.     Physical Exam     Initial Vitals [25 0859]   BP Pulse Resp Temp SpO2   (!) 197/85 89 18 98.2 °F (36.8 °C) 100 %      MAP       --         Physical Exam    Nursing note and vitals reviewed.  Constitutional: She appears well-developed and well-nourished.   Uncomfortable appearing.    HENT:   Head: Normocephalic and atraumatic.   Nose: Nose normal. Mouth/Throat: Oropharynx is clear and moist.   Eyes: Conjunctivae and EOM are normal.   Neck: Neck supple.   Normal range of motion.  Cardiovascular:  Normal rate, regular rhythm, normal heart sounds and intact distal pulses.           Pulmonary/Chest: Breath sounds normal. No respiratory distress. She has no wheezes. She has no rhonchi. She has no rales.   Abdominal: Abdomen is soft. Bowel " sounds are normal. She exhibits no distension. There is no abdominal tenderness.   No focal abdominal or pelvic tenderness to palpation. There is no rebound and no guarding.   Musculoskeletal:         General: Normal range of motion.      Cervical back: Normal range of motion and neck supple.      Comments: Left CVA tenderness to palpation.     Neurological: She is alert and oriented to person, place, and time. She has normal strength.   Skin: Skin is warm and dry.   Psychiatric: She has a normal mood and affect. Her behavior is normal. Judgment and thought content normal.         ED Course   Procedures  Labs Reviewed   URINALYSIS, REFLEX TO URINE CULTURE - Abnormal       Result Value    Color, UA Yellow      Appearance, UA Clear      pH, UA 6.0      Spec Grav UA 1.015      Protein, UA Negative      Glucose, UA 4+ (*)     Ketones, UA Negative      Bilirubin, UA Negative      Blood, UA 3+ (*)     Nitrites, UA Negative      Urobilinogen, UA Negative      Leukocyte Esterase, UA 1+ (*)    COMPREHENSIVE METABOLIC PANEL - Abnormal    Sodium 137      Potassium 5.2 (*)     Chloride 106      CO2 18 (*)     Glucose 291 (*)     BUN 19      Creatinine 1.1      Calcium 10.2      Protein Total 8.7 (*)     Albumin 4.1      Bilirubin Total 0.2            AST 42      ALT 38      Anion Gap 13      eGFR 55 (*)     Narrative:     Specimen moderately hemolyzed.   CBC WITH DIFFERENTIAL - Abnormal    WBC 9.08      RBC 4.55      HGB 13.5      HCT 40.2      MCV 88      MCH 29.7      MCHC 33.6      RDW 12.5      Platelet Count 336      MPV 9.1 (*)     Nucleated RBC 0      Neut % 56.5      Lymph % 33.1      Mono % 6.3      Eos % 2.1      Basophil % 0.7      Imm Grans % 1.3 (*)     Neut # 5.13      Lymph # 3.01      Mono # 0.57      Eos # 0.19      Baso # 0.06      Imm Grans # 0.12 (*)    URINALYSIS MICROSCOPIC - Abnormal    RBC, UA 58 (*)     WBC, UA 22 (*)     Bacteria, UA Occasional      Yeast, UA None      Squamous Epithelial Cells,  UA 1      Microscopic Comment       CULTURE, URINE   CBC W/ AUTO DIFFERENTIAL    Narrative:     The following orders were created for panel order CBC auto differential.  Procedure                               Abnormality         Status                     ---------                               -----------         ------                     CBC with Differential[[id number]]       Abnormal            Final result                 Please view results for these tests on the individual orders.   GREY TOP URINE HOLD    Extra Tube Hold for add-ons.            Imaging Results              CT Renal Stone Study ABD Pelvis WO (Final result)  Result time 05/13/25 09:56:52      Final result by Linwood Christensen Jr., MD (05/13/25 09:56:52)                   Impression:      Bilateral intrarenal collecting system staghorn and non staghornd stones, overall similar compared with the prior exam with the exception of a stone or stone fragment that has migrated into the left UPJ.  Intrarenal collecting system dilatation versus peripelvic renal cysts appears similar to the prior exam.      Electronically signed by: Linwood Marley Jr  Date:    05/13/2025  Time:    09:56               Narrative:    EXAMINATION:  CT RENAL STONE STUDY ABD PELVIS WO    CLINICAL HISTORY:  Flank pain, kidney stone suspected;    TECHNIQUE:  Low dose axial images, sagittal and coronal reformations were obtained from the lung bases to the pubic symphysis.  Contrast was not administered.    COMPARISON:  Similar study 10 17 23.    FINDINGS:  Lung Bases: Bibasilar pleuroparenchymal lung scarring.  Moderate-sized sliding hiatal hernia.    Kidneys/ Ureters: There is redemonstration of bilateral staghorn intrarenal calculi as well as numerous punctate subcentimeter intrarenal calculi bilaterally.  There is stable dilatation of the renal collecting systems versus peripelvic cysts bilaterally.  There is a 3 x 6 x 8 mm stone or stone fragment that has migrated into  the left UPJ.  No distal ureteral stone.    Liver: Normal in size and attenuation, with no focal hepatic lesions.    Gallbladder: No calcified gallstones.    Bile Ducts: No evidence of dilated ducts.    Pancreas: No mass or peripancreatic fat stranding.    Spleen: Unremarkable.    Adrenals: Unremarkable.    Pelvic organs: Unremarkable.    GI Tract/Mesentery: No evidence of bowel obstruction or inflammation.    Retroperitoneum: No significant adenopathy.    Vasculature: No significant atherosclerosis or aneurysm.    Bones: Unremarkable.                                       Medications   melatonin tablet 6 mg (has no administration in time range)   sodium chloride 0.9% flush 10 mL (has no administration in time range)   ondansetron injection 4 mg (4 mg Intravenous Given 5/13/25 1845)   prochlorperazine injection Soln 5 mg (has no administration in time range)   acetaminophen tablet 650 mg (has no administration in time range)   naloxone 0.4 mg/mL injection 0.02 mg (has no administration in time range)   ketorolac injection 15 mg (has no administration in time range)   morphine injection 4 mg (has no administration in time range)   0.9% NaCl infusion ( Intravenous New Bag 5/13/25 1847)   sodium chloride 0.9% bolus 1,000 mL 1,000 mL (0 mLs Intravenous Stopped 5/13/25 1102)   ondansetron injection 4 mg (4 mg Intravenous Given 5/13/25 0926)   ketorolac injection 15 mg (15 mg Intravenous Given 5/13/25 0926)   morphine injection 4 mg (4 mg Intravenous Given 5/13/25 1113)   morphine injection 4 mg (4 mg Intravenous Given 5/13/25 1217)     Medical Decision Making  Amount and/or Complexity of Data Reviewed  Labs: ordered.  Radiology: ordered.    Risk  OTC drugs.  Prescription drug management.               ED Course as of 05/14/25 0112   Tue May 13, 2025   1045 Case was discussed with Dr. Marcano (Urology) who will send her resident to the ED to evaluate the patient.  [CL]      ED Course User Index  [CL] John Todd PA-C     "           Medical Decision Making:   Initial Assessment:   Urgent evaluation of 69-year-old female with history of kidney stones presenting for left flank pain that began approximately 20 minutes prior to arrival today.  She describes the pain has "kidney stone pain" as it feels similar to previous episodes of nephrolithiasis.   She is also reporting associated nausea and non bloody emesis.  No fever, chills,  abdominal pain, pelvic pain, urinary retention, hematuria, or dysuria. On exam, she is uncomfortable appearing but nontoxic.  Hemodynamically stable.  Afebrile in the ED. Left CVA tenderness to palpation. No focal abdominal or pelvic tenderness to palpation.  Plan for labs and CT renal stone study.  Will give IV fluids, antiemetics, and dose of Toradol.  Will continue to reassess.  Differential Diagnosis:   Differential diagnosis includes but not limited to acute cystitis, acute pyelonephritis, nephrolithiasis, ureterolithiasis, hydronephrosis, lumbar strain, contusion, hematoma,   Clinical Tests:   Lab Tests: Ordered and Reviewed  Radiological Study: Ordered and Reviewed  ED Management:  On review of labs, no leukocytosis or anemia.  Normal platelet count.  Potassium of 5.2.  Renal function is stable. Normal liver function. Serum glucose of 291. Normal anion gap.  UA today notable for 4+ glucose, 3+ blood,  And 1+ leukocytes.  No nitrites.  There are 22 white blood cells, occasional bacteria, and 1 squamous epithelial cell on microscopy.  She denies any dysuria or foul-smelling urine.  Results are inconsistent with UTI.  CT renal stone study today reveals "bilateral intrarenal collecting system staghorn and non staghornd stones, overall similar compared with the prior exam with the exception of a stone or stone fragment that has migrated into the left UPJ." Fragment in the left UPJ measures "3 x 6 x 8 mm." I updated the patient on all results. She reports her pain is returning after round of Toradol and " morphine. Will give another dose of Morphine. Case was discussed with Dr. Marcano (urology) who plans to send her resident, Dr. Jerardo Collins down to the ED to evaluate the patient.     Pt was moved to ED to await urology consult as the resident is still in the OR. Pt reports her pain has resolved after second dose of morphine.     Pt was evaluated by Dr. Marcano (urology) who placed a consult to IR for decompression with placement of L PCN. Per Dr. Marcano, urology does not plan for stent placement. Dr. Marcano recommends admission to , pain control and NPO at midnight.  I updated the patient on plan of care.  She agrees with plan for admission.  Case was discussed with Dr. Huerta () who will admit the patient to her service for further management of left ureterolithiasis.              Clinical Impression:  Final diagnoses:  [N20.1] Ureterolithiasis (Primary)  [R10.9] Left flank pain          ED Disposition Condition    Observation                     [1]   Social History  Tobacco Use    Smoking status: Never    Smokeless tobacco: Never   Substance Use Topics    Alcohol use: Yes    Drug use: Never        John Todd PA-C  05/14/25 0112

## 2025-05-13 NOTE — CONSULTS
[facility] Emergency Dept (Observation)  Urology  Consult Note    Patient Name: Kylah Shearer  MRN: [id number]  Admission Date: 2025  Hospital Length of Stay: 0   Code Status: No Order   Attending Provider: No att. providers found   Consulting Provider: Gill Marcano MD  Primary Care Physician: No, Primary Doctor  Principal Problem:Nephrolithiasis    Inpatient consult to Urology  Consult performed by: Gill Marcano MD  Consult ordered by: Jerardo Ro DO          Subjective:     HPI:   68 y.o. female presents to the ED c/o severe waxing and waning L flank pain acute onset this afternoon. CT scan performed showed bilateral staghorn calculi with 8mm L UPJ stone. CT findings similar to large stone burden seen on CT scan in 10/2023 except for the new UPJ stone. She reports long h/o stones with several procedures in the past (ureteroscopy). Denies prior PCNL. Denies procedure since last CT 10/2023. Denies fevers.         Past Medical History:   Diagnosis Date    Kidney stones        Past Surgical History:   Procedure Laterality Date     SECTION         Review of patient's allergies indicates:   Allergen Reactions    Penicillins Anaphylaxis       Family History       Problem Relation (Age of Onset)    Diabetes Mother, Father            Tobacco Use    Smoking status: Never    Smokeless tobacco: Never   Substance and Sexual Activity    Alcohol use: Yes    Drug use: Never    Sexual activity: Not on file       Review of Systems   Constitutional:  Negative for appetite change, chills and fever.   HENT:  Negative for congestion, sore throat and trouble swallowing.    Eyes:  Negative for pain and itching.   Respiratory:  Negative for cough and shortness of breath.    Cardiovascular:  Negative for chest pain, palpitations and leg swelling.   Gastrointestinal:  Negative for abdominal distention, abdominal pain, constipation, diarrhea, nausea and vomiting.   Genitourinary:  Positive for flank pain.  Negative for difficulty urinating, dysuria, hematuria, nocturia and urgency.   Musculoskeletal:  Negative for back pain, neck pain and neck stiffness.   Skin:  Negative for rash and wound.   Neurological:  Negative for dizziness and seizures.   Hematological:  Negative for adenopathy. Does not bruise/bleed easily.   Psychiatric/Behavioral:  Negative for confusion. The patient is not nervous/anxious.    All other systems reviewed and are negative.      Objective:     Temp:  [98.2 °F (36.8 °C)] 98.2 °F (36.8 °C)  Pulse:  [80-96] 84  Resp:  [18-20] 20  SpO2:  [97 %-100 %] 98 %  BP: (141-197)/(63-85) 141/63  Weight: 54.4 kg (120 lb)  Body mass index is 21.95 kg/m².    Date 05/13/25 0700 - 05/14/25 0659   Shift 0741-4990 0930-0379 5511-5687 24 Hour Total   INTAKE   IV Piggyback 1000   1000   Shift Total(mL/kg) 1000(18.4)   1000(18.4)   OUTPUT   Shift Total(mL/kg)       Weight (kg) 54.4 54.4 54.4 54.4          Drains       None                    Physical Exam  Vitals reviewed.   Constitutional:       General: She is not in acute distress.     Appearance: She is well-developed. She is not diaphoretic.   HENT:      Head: Normocephalic and atraumatic.   Pulmonary:      Effort: Pulmonary effort is normal. No respiratory distress.   Abdominal:      General: There is no distension.      Palpations: Abdomen is soft. There is no mass.      Tenderness: There is left CVA tenderness. There is no right CVA tenderness, guarding or rebound.   Musculoskeletal:         General: Normal range of motion.      Cervical back: Normal range of motion.   Skin:     General: Skin is warm and dry.      Findings: No erythema or rash.   Neurological:      Mental Status: She is alert and oriented to person, place, and time.   Psychiatric:         Behavior: Behavior normal.          Significant Labs:    BMP:  Recent Labs   Lab 05/13/25  0916      K 5.2*      CO2 18*   BUN 19   CREATININE 1.1   CALCIUM 10.2       CBC:  Recent Labs   Lab  "05/13/25  0916   WBC 9.08   HGB 13.5   HCT 40.2          Blood Culture: No results for input(s): "LABBLOO" in the last 168 hours.  Urine Culture: No results for input(s): "LABURIN" in the last 168 hours.  Urine Studies:   Recent Labs   Lab 05/13/25  0917   COLORU Yellow   APPEARANCEUA Clear   PHUR 6.0   SPECGRAV 1.015   PROTEINUA Negative   GLUCUA 4+*   BILIRUBINUA Negative   OCCULTUA 3+*   NITRITE Negative   UROBILINOGEN Negative   LEUKOCYTESUR 1+*   RBCUA 58*   WBCUA 22*   BACTERIA Occasional       Significant Imaging:  All pertinent imaging results/findings from the past 24 hours have been reviewed.      Assessment and Plan:     * Nephrolithiasis   - Large stone burden bilaterally with partial staghorn calculi also seen on CT 10/2023   - New 8mm L UPJ stone, likely cause of pain   - Recommend decompression with placement of L PCN by IR. She will need PCNL for definitive treatment of large stones. Will also need PCNL on R side at some point in future as well but can hold on R PCN placement for now.    - Long discussion re: options of placing PCN now or awaiting alessandra-op placement of PCN at time of PCNL. Suspect she will have continued pain without decompression with PCN. She is agreeable to L PCN.    - Okay to eat now. NPO at MN   - Consult placed to IR for L PCN placement    Obstruction of left ureteropelvic junction (UPJ) due to stone   - 8mm L UPJ stone    - Recommend L PCN placement by IR due to large volume stone burden and need for future PCNL        VTE Risk Mitigation (From admission, onward)      None            Thank you for your consult. I will follow-up with patient. Please contact us if you have any additional questions.    Gill Marcano MD  Urology  [facility] - Emergency Dept (Observation)  "

## 2025-05-13 NOTE — ED NOTES
Pt. Is a 69 yr. Old female. Pt. Presents to the with left side abdominal pain that started this morning. Pt. Does have a HX of Kidney Stones. Pt. Reports 9/10 pain that comes & goes. Pt. Is alert and ABC's are intact.    HEAD-HEENT,ABC's are intact  NECK-No JVD or trach deviation  CHEST-CBBS=EXP, No pain  ABD-Left side pain,No distention,tender to touch  -Denies any urinary symptoms  SKIN-warm & Dry  UPPER EXT.-full range of motion,+pulses & sensation  LOWWER EXT.-full range of motion,+pulses  & sensation

## 2025-05-13 NOTE — LETTER
May 15, 2025         [address]  [place] [address]  Phone: [phone]  Fax: [phone]       Patient: Kylah Shearer   YOB: 1955  Date of Visit: 05/15/2025    To Whom It May Concern:    Dot Shearer  was at [facility] on 05/15/2025. She may return to work/school on 6/2/2025 with no restrictions. If you have any questions or concerns, or if I can be of further assistance, please do not hesitate to contact me.    Sincerely,    Sara Barrett, DNP

## 2025-05-13 NOTE — ED NOTES
Pt reporting nausea 10/10, has not had any emesis at this point, pt given emesis bag.  Will message provider.

## 2025-05-13 NOTE — SUBJECTIVE & OBJECTIVE
Past Medical History:   Diagnosis Date    Kidney stones        Past Surgical History:   Procedure Laterality Date     SECTION         Review of patient's allergies indicates:   Allergen Reactions    Penicillins Anaphylaxis       Family History       Problem Relation (Age of Onset)    Diabetes Mother, Father            Tobacco Use    Smoking status: Never    Smokeless tobacco: Never   Substance and Sexual Activity    Alcohol use: Yes    Drug use: Never    Sexual activity: Not on file       Review of Systems   Constitutional:  Negative for appetite change, chills and fever.   HENT:  Negative for congestion, sore throat and trouble swallowing.    Eyes:  Negative for pain and itching.   Respiratory:  Negative for cough and shortness of breath.    Cardiovascular:  Negative for chest pain, palpitations and leg swelling.   Gastrointestinal:  Negative for abdominal distention, abdominal pain, constipation, diarrhea, nausea and vomiting.   Genitourinary:  Positive for flank pain. Negative for difficulty urinating, dysuria, hematuria, nocturia and urgency.   Musculoskeletal:  Negative for back pain, neck pain and neck stiffness.   Skin:  Negative for rash and wound.   Neurological:  Negative for dizziness and seizures.   Hematological:  Negative for adenopathy. Does not bruise/bleed easily.   Psychiatric/Behavioral:  Negative for confusion. The patient is not nervous/anxious.    All other systems reviewed and are negative.      Objective:     Temp:  [98.2 °F (36.8 °C)] 98.2 °F (36.8 °C)  Pulse:  [80-96] 84  Resp:  [18-20] 20  SpO2:  [97 %-100 %] 98 %  BP: (141-197)/(63-85) 141/63  Weight: 54.4 kg (120 lb)  Body mass index is 21.95 kg/m².    Date 25 07 - 25 0659   Shift 7188-2095 1749-9599 4773-4163 24 Hour Total   INTAKE   IV Piggyback 1000   1000   Shift Total(mL/kg) 1000(18.4)   1000(18.4)   OUTPUT   Shift Total(mL/kg)       Weight (kg) 54.4 54.4 54.4 54.4          Drains       None                   "  Physical Exam  Vitals reviewed.   Constitutional:       General: She is not in acute distress.     Appearance: She is well-developed. She is not diaphoretic.   HENT:      Head: Normocephalic and atraumatic.   Pulmonary:      Effort: Pulmonary effort is normal. No respiratory distress.   Abdominal:      General: There is no distension.      Palpations: Abdomen is soft. There is no mass.      Tenderness: There is left CVA tenderness. There is no right CVA tenderness, guarding or rebound.   Musculoskeletal:         General: Normal range of motion.      Cervical back: Normal range of motion.   Skin:     General: Skin is warm and dry.      Findings: No erythema or rash.   Neurological:      Mental Status: She is alert and oriented to person, place, and time.   Psychiatric:         Behavior: Behavior normal.          Significant Labs:    BMP:  Recent Labs   Lab 05/13/25  0916      K 5.2*      CO2 18*   BUN 19   CREATININE 1.1   CALCIUM 10.2       CBC:  Recent Labs   Lab 05/13/25  0916   WBC 9.08   HGB 13.5   HCT 40.2          Blood Culture: No results for input(s): "LABBLOO" in the last 168 hours.  Urine Culture: No results for input(s): "LABURIN" in the last 168 hours.  Urine Studies:   Recent Labs   Lab 05/13/25  0917   COLORU Yellow   APPEARANCEUA Clear   PHUR 6.0   SPECGRAV 1.015   PROTEINUA Negative   GLUCUA 4+*   BILIRUBINUA Negative   OCCULTUA 3+*   NITRITE Negative   UROBILINOGEN Negative   LEUKOCYTESUR 1+*   RBCUA 58*   WBCUA 22*   BACTERIA Occasional       Significant Imaging:  All pertinent imaging results/findings from the past 24 hours have been reviewed.    "

## 2025-05-13 NOTE — SUBJECTIVE & OBJECTIVE
Past Medical History:   Diagnosis Date    Kidney stones        Past Surgical History:   Procedure Laterality Date     SECTION         Review of patient's allergies indicates:   Allergen Reactions    Penicillins Anaphylaxis       No current facility-administered medications on file prior to encounter.     Current Outpatient Medications on File Prior to Encounter   Medication Sig    docusate sodium (COLACE) 100 MG capsule Take 1 capsule (100 mg total) by mouth 2 (two) times daily as needed for Constipation.    HYDROcodone-acetaminophen (NORCO) 5-325 mg per tablet Take 1 tablet by mouth every 6 (six) hours as needed for Pain.    ondansetron (ZOFRAN-ODT) 4 MG TbDL Take 1 tablet (4 mg total) by mouth every 8 (eight) hours as needed.    tamsulosin (FLOMAX) 0.4 mg Cap Take 1 capsule (0.4 mg total) by mouth once daily.     Family History       Problem Relation (Age of Onset)    Diabetes Mother, Father          Tobacco Use    Smoking status: Never    Smokeless tobacco: Never   Substance and Sexual Activity    Alcohol use: Yes    Drug use: Never    Sexual activity: Not on file     Review of Systems   Constitutional:  Negative for activity change, appetite change, chills and fever.   HENT:  Negative for congestion, sore throat and trouble swallowing.    Eyes:  Negative for photophobia and visual disturbance.   Respiratory:  Negative for cough, chest tightness and shortness of breath.    Cardiovascular:  Negative for chest pain, palpitations and leg swelling.   Gastrointestinal:  Negative for abdominal pain, diarrhea and nausea.   Genitourinary:  Positive for flank pain. Negative for dysuria and hematuria.   Musculoskeletal:  Negative for back pain.   Neurological:  Negative for dizziness, weakness and headaches.   Psychiatric/Behavioral:  Negative for confusion.      Objective:     Vital Signs (Most Recent):  Temp: 98.2 °F (36.8 °C) (25 1218)  Pulse: 84 (25 1400)  Resp: 20 (25 1218)  BP: (!) 141/63  (05/13/25 1400)  SpO2: 98 % (05/13/25 1400) Vital Signs (24h Range):  Temp:  [98.2 °F (36.8 °C)] 98.2 °F (36.8 °C)  Pulse:  [80-96] 84  Resp:  [18-20] 20  SpO2:  [97 %-100 %] 98 %  BP: (141-197)/(63-85) 141/63     Weight: 54.4 kg (120 lb)  Body mass index is 21.95 kg/m².     Physical Exam  Vitals reviewed.   Constitutional:       Appearance: Normal appearance. She is normal weight.   HENT:      Head: Normocephalic.      Mouth/Throat:      Mouth: Mucous membranes are moist.      Pharynx: Oropharynx is clear.   Eyes:      General: Lids are normal. Gaze aligned appropriately.      Conjunctiva/sclera: Conjunctivae normal.   Cardiovascular:      Rate and Rhythm: Normal rate and regular rhythm.      Pulses: Normal pulses.      Heart sounds: Normal heart sounds.   Pulmonary:      Effort: Pulmonary effort is normal.      Breath sounds: Normal breath sounds.   Abdominal:      General: Bowel sounds are normal.      Palpations: Abdomen is soft.      Tenderness: There is left CVA tenderness.   Musculoskeletal:         General: Normal range of motion.      Cervical back: Normal range of motion.   Skin:     General: Skin is warm and dry.   Neurological:      Mental Status: She is alert and oriented to person, place, and time. Mental status is at baseline.   Psychiatric:         Mood and Affect: Mood normal.                Significant Labs: All pertinent labs within the past 24 hours have been reviewed.  CBC:   Recent Labs   Lab 05/13/25  0916   WBC 9.08   HGB 13.5   HCT 40.2        CMP:   Recent Labs   Lab 05/13/25  0916      K 5.2*      CO2 18*   *   BUN 19   CREATININE 1.1   CALCIUM 10.2   PROT 8.7*   ALBUMIN 4.1   BILITOT 0.2   ALKPHOS 113   AST 42   ALT 38   ANIONGAP 13       Significant Imaging: I have reviewed all pertinent imaging results/findings within the past 24 hours.  Imaging Results              CT Renal Stone Study ABD Pelvis WO (Final result)  Result time 05/13/25 09:56:52      Final  result by Linwood Christensen Jr., MD (05/13/25 09:56:52)                   Impression:      Bilateral intrarenal collecting system staghorn and non staghornd stones, overall similar compared with the prior exam with the exception of a stone or stone fragment that has migrated into the left UPJ.  Intrarenal collecting system dilatation versus peripelvic renal cysts appears similar to the prior exam.      Electronically signed by: Linwood Marley Jr  Date:    05/13/2025  Time:    09:56               Narrative:    EXAMINATION:  CT RENAL STONE STUDY ABD PELVIS WO    CLINICAL HISTORY:  Flank pain, kidney stone suspected;    TECHNIQUE:  Low dose axial images, sagittal and coronal reformations were obtained from the lung bases to the pubic symphysis.  Contrast was not administered.    COMPARISON:  Similar study 10 17 23.    FINDINGS:  Lung Bases: Bibasilar pleuroparenchymal lung scarring.  Moderate-sized sliding hiatal hernia.    Kidneys/ Ureters: There is redemonstration of bilateral staghorn intrarenal calculi as well as numerous punctate subcentimeter intrarenal calculi bilaterally.  There is stable dilatation of the renal collecting systems versus peripelvic cysts bilaterally.  There is a 3 x 6 x 8 mm stone or stone fragment that has migrated into the left UPJ.  No distal ureteral stone.    Liver: Normal in size and attenuation, with no focal hepatic lesions.    Gallbladder: No calcified gallstones.    Bile Ducts: No evidence of dilated ducts.    Pancreas: No mass or peripancreatic fat stranding.    Spleen: Unremarkable.    Adrenals: Unremarkable.    Pelvic organs: Unremarkable.    GI Tract/Mesentery: No evidence of bowel obstruction or inflammation.    Retroperitoneum: No significant adenopathy.    Vasculature: No significant atherosclerosis or aneurysm.    Bones: Unremarkable.

## 2025-05-13 NOTE — LETTER
May 15, 2025         [address]  [place] [address]  Phone: [phone]  Fax: [phone]       Patient: Kylah Shearer   YOB: 1955  Date of Visit: 05/15/2025    To Whom It May Concern:    Dot Shearer  was at [facility] on 05/15/2025. She may return to work/school on 5/26/2025 with no restrictions. If you have any questions or concerns, or if I can be of further assistance, please do not hesitate to contact me.    Sincerely,    Sara Barrett, DNP

## 2025-05-13 NOTE — ASSESSMENT & PLAN NOTE
- 8mm L UPJ stone    - Recommend L PCN placement by IR due to large volume stone burden and need for future PCNL

## 2025-05-13 NOTE — HPI
68 y.o. female presents to the ED c/o severe waxing and waning L flank pain acute onset this afternoon. CT scan performed showed bilateral staghorn calculi with 8mm L UPJ stone. CT findings similar to large stone burden seen on CT scan in 10/2023 except for the new UPJ stone. She reports long h/o stones with several procedures in the past (ureteroscopy). Denies prior PCNL. Denies procedure since last CT 10/2023. Denies fevers.

## 2025-05-13 NOTE — Clinical Note
20 ml of contrast were injected throughout the case. 30 mL of contrast was the total wasted during the case. 50 mL was the total amount used during the case.
An airway assessment has been completed by MD.
ID band present and verified. Family is not present.
Phone report was given to FLOOR RN
The ECG showed sinus rhythm.
The ECG showed sinus rhythm.
The bilateral back, mid back and lower back was prepped. The site was prepped with ChloraPrep. The site was clipped. The patient was draped.
The procedural consent was signed. A history and physical note was completed in the chart.
The skin was dry, was intact, was without bleeding and was without hematoma.
None

## 2025-05-13 NOTE — ASSESSMENT & PLAN NOTE
- Large stone burden bilaterally with partial staghorn calculi also seen on CT 10/2023   - New 8mm L UPJ stone, likely cause of pain   - Recommend decompression with placement of L PCN by IR. She will need PCNL for definitive treatment of large stones. Will also need PCNL on R side at some point in future as well but can hold on R PCN placement for now.    - Long discussion re: options of placing PCN now or awaiting alessandra-op placement of PCN at time of PCNL. Suspect she will have continued pain without decompression with PCN. She is agreeable to L PCN.    - Okay to eat now. NPO at MN   - Consult placed to IR for L PCN placement

## 2025-05-13 NOTE — LETTER
May 15, 2025         [address]  [place] [address]  Phone: [phone]  Fax: [phone]       Patient: Kylah Shearer   YOB: 1955  Date of Visit: 05/15/2025    To Whom It May Concern:    Dot Shearer  was at [facility] on 05/15/2025. She may return to work/school on 5/19/2025 with no restrictions. If you have any questions or concerns, or if I can be of further assistance, please do not hesitate to contact me.    Sincerely,    Sara Barrett, DNP

## 2025-05-14 PROBLEM — R73.9 HYPERGLYCEMIA: Status: ACTIVE | Noted: 2025-05-14

## 2025-05-14 PROBLEM — R03.0 ELEVATED BLOOD-PRESSURE READING WITHOUT DIAGNOSIS OF HYPERTENSION: Status: ACTIVE | Noted: 2025-05-14

## 2025-05-14 LAB
ABSOLUTE EOSINOPHIL (OHS): 0.22 K/UL
ABSOLUTE MONOCYTE (OHS): 0.58 K/UL (ref 0.3–1)
ABSOLUTE NEUTROPHIL COUNT (OHS): 4.63 K/UL (ref 1.8–7.7)
ALBUMIN SERPL BCP-MCNC: 3.1 G/DL (ref 3.5–5.2)
ALP SERPL-CCNC: 76 UNIT/L (ref 40–150)
ALT SERPL W/O P-5'-P-CCNC: 24 UNIT/L (ref 10–44)
ANION GAP (OHS): 7 MMOL/L (ref 8–16)
AST SERPL-CCNC: 19 UNIT/L (ref 11–45)
BACTERIA UR CULT: NO GROWTH
BASOPHILS # BLD AUTO: 0.03 K/UL
BASOPHILS NFR BLD AUTO: 0.3 %
BILIRUB SERPL-MCNC: 0.2 MG/DL (ref 0.1–1)
BUN SERPL-MCNC: 22 MG/DL (ref 8–23)
CALCIUM SERPL-MCNC: 8.7 MG/DL (ref 8.7–10.5)
CHLORIDE SERPL-SCNC: 110 MMOL/L (ref 95–110)
CO2 SERPL-SCNC: 22 MMOL/L (ref 23–29)
CREAT SERPL-MCNC: 1 MG/DL (ref 0.5–1.4)
EAG (OHS): 220 MG/DL (ref 68–131)
ERYTHROCYTE [DISTWIDTH] IN BLOOD BY AUTOMATED COUNT: 12.5 % (ref 11.5–14.5)
GFR SERPLBLD CREATININE-BSD FMLA CKD-EPI: >60 ML/MIN/1.73/M2
GLUCOSE SERPL-MCNC: 227 MG/DL (ref 70–110)
HBA1C MFR BLD: 9.3 % (ref 4–5.6)
HCT VFR BLD AUTO: 35 % (ref 37–48.5)
HGB BLD-MCNC: 11.2 GM/DL (ref 12–16)
IMM GRANULOCYTES # BLD AUTO: 0.08 K/UL (ref 0–0.04)
IMM GRANULOCYTES NFR BLD AUTO: 0.9 % (ref 0–0.5)
LYMPHOCYTES # BLD AUTO: 3.2 K/UL (ref 1–4.8)
MCH RBC QN AUTO: 29.3 PG (ref 27–31)
MCHC RBC AUTO-ENTMCNC: 32 G/DL (ref 32–36)
MCV RBC AUTO: 92 FL (ref 82–98)
NUCLEATED RBC (/100WBC) (OHS): 0 /100 WBC
PLATELET # BLD AUTO: 289 K/UL (ref 150–450)
PMV BLD AUTO: 9.5 FL (ref 9.2–12.9)
POCT GLUCOSE: 294 MG/DL (ref 70–110)
POTASSIUM SERPL-SCNC: 4.3 MMOL/L (ref 3.5–5.1)
PROT SERPL-MCNC: 6.4 GM/DL (ref 6–8.4)
RBC # BLD AUTO: 3.82 M/UL (ref 4–5.4)
RELATIVE EOSINOPHIL (OHS): 2.5 %
RELATIVE LYMPHOCYTE (OHS): 36.6 % (ref 18–48)
RELATIVE MONOCYTE (OHS): 6.6 % (ref 4–15)
RELATIVE NEUTROPHIL (OHS): 53.1 % (ref 38–73)
SODIUM SERPL-SCNC: 139 MMOL/L (ref 136–145)
TSH SERPL-ACNC: 1.2 UIU/ML (ref 0.4–4)
WBC # BLD AUTO: 8.74 K/UL (ref 3.9–12.7)

## 2025-05-14 PROCEDURE — 85025 COMPLETE CBC W/AUTO DIFF WBC: CPT | Performed by: NURSE PRACTITIONER

## 2025-05-14 PROCEDURE — 94761 N-INVAS EAR/PLS OXIMETRY MLT: CPT

## 2025-05-14 PROCEDURE — 99213 OFFICE O/P EST LOW 20 MIN: CPT | Mod: ,,, | Performed by: UROLOGY

## 2025-05-14 PROCEDURE — 99203 OFFICE O/P NEW LOW 30 MIN: CPT | Mod: 25,,, | Performed by: RADIOLOGY

## 2025-05-14 PROCEDURE — 36415 COLL VENOUS BLD VENIPUNCTURE: CPT | Performed by: NURSE PRACTITIONER

## 2025-05-14 PROCEDURE — 96372 THER/PROPH/DIAG INJ SC/IM: CPT | Performed by: NURSE PRACTITIONER

## 2025-05-14 PROCEDURE — G0378 HOSPITAL OBSERVATION PER HR: HCPCS

## 2025-05-14 PROCEDURE — 84443 ASSAY THYROID STIM HORMONE: CPT | Performed by: NURSE PRACTITIONER

## 2025-05-14 PROCEDURE — C1729 CATH, DRAINAGE: HCPCS | Performed by: RADIOLOGY

## 2025-05-14 PROCEDURE — 63600175 PHARM REV CODE 636 W HCPCS: Performed by: NURSE PRACTITIONER

## 2025-05-14 PROCEDURE — 25000003 PHARM REV CODE 250: Performed by: NURSE PRACTITIONER

## 2025-05-14 PROCEDURE — 84155 ASSAY OF PROTEIN SERUM: CPT | Performed by: NURSE PRACTITIONER

## 2025-05-14 PROCEDURE — C1769 GUIDE WIRE: HCPCS | Performed by: RADIOLOGY

## 2025-05-14 PROCEDURE — 63600175 PHARM REV CODE 636 W HCPCS

## 2025-05-14 PROCEDURE — 96361 HYDRATE IV INFUSION ADD-ON: CPT

## 2025-05-14 PROCEDURE — 83036 HEMOGLOBIN GLYCOSYLATED A1C: CPT | Performed by: NURSE PRACTITIONER

## 2025-05-14 PROCEDURE — 25500020 PHARM REV CODE 255: Performed by: RADIOLOGY

## 2025-05-14 PROCEDURE — 63600175 PHARM REV CODE 636 W HCPCS: Performed by: RADIOLOGY

## 2025-05-14 PROCEDURE — 99152 MOD SED SAME PHYS/QHP 5/>YRS: CPT | Performed by: RADIOLOGY

## 2025-05-14 RX ORDER — HEPARIN SOD,PORCINE/0.9 % NACL 1000/500ML
INTRAVENOUS SOLUTION INTRAVENOUS
Status: DISCONTINUED | OUTPATIENT
Start: 2025-05-14 | End: 2025-05-14 | Stop reason: HOSPADM

## 2025-05-14 RX ORDER — FENTANYL CITRATE 50 UG/ML
INJECTION, SOLUTION INTRAMUSCULAR; INTRAVENOUS
Status: DISCONTINUED | OUTPATIENT
Start: 2025-05-14 | End: 2025-05-14 | Stop reason: HOSPADM

## 2025-05-14 RX ORDER — OXYCODONE HYDROCHLORIDE 5 MG/1
5 TABLET ORAL EVERY 8 HOURS
Refills: 0 | Status: DISCONTINUED | OUTPATIENT
Start: 2025-05-14 | End: 2025-05-14

## 2025-05-14 RX ORDER — MIDAZOLAM HYDROCHLORIDE 1 MG/ML
INJECTION INTRAMUSCULAR; INTRAVENOUS
Status: DISCONTINUED | OUTPATIENT
Start: 2025-05-14 | End: 2025-05-14 | Stop reason: HOSPADM

## 2025-05-14 RX ORDER — GLUCAGON 1 MG
1 KIT INJECTION
Status: DISCONTINUED | OUTPATIENT
Start: 2025-05-14 | End: 2025-05-15 | Stop reason: HOSPADM

## 2025-05-14 RX ORDER — HYDROXYZINE HYDROCHLORIDE 25 MG/1
25 TABLET, FILM COATED ORAL 3 TIMES DAILY PRN
Status: DISCONTINUED | OUTPATIENT
Start: 2025-05-14 | End: 2025-05-15 | Stop reason: HOSPADM

## 2025-05-14 RX ORDER — INSULIN ASPART 100 [IU]/ML
0-5 INJECTION, SOLUTION INTRAVENOUS; SUBCUTANEOUS
Status: DISCONTINUED | OUTPATIENT
Start: 2025-05-14 | End: 2025-05-15 | Stop reason: HOSPADM

## 2025-05-14 RX ORDER — AMLODIPINE BESYLATE 2.5 MG/1
2.5 TABLET ORAL DAILY
Status: DISCONTINUED | OUTPATIENT
Start: 2025-05-14 | End: 2025-05-15 | Stop reason: HOSPADM

## 2025-05-14 RX ORDER — IBUPROFEN 200 MG
16 TABLET ORAL
Status: DISCONTINUED | OUTPATIENT
Start: 2025-05-14 | End: 2025-05-15 | Stop reason: HOSPADM

## 2025-05-14 RX ORDER — IBUPROFEN 200 MG
24 TABLET ORAL
Status: DISCONTINUED | OUTPATIENT
Start: 2025-05-14 | End: 2025-05-15 | Stop reason: HOSPADM

## 2025-05-14 RX ORDER — LIDOCAINE HYDROCHLORIDE 10 MG/ML
INJECTION, SOLUTION INFILTRATION; PERINEURAL
Status: DISCONTINUED | OUTPATIENT
Start: 2025-05-14 | End: 2025-05-14 | Stop reason: HOSPADM

## 2025-05-14 RX ORDER — OXYCODONE HYDROCHLORIDE 5 MG/1
5 TABLET ORAL EVERY 8 HOURS PRN
Refills: 0 | Status: DISCONTINUED | OUTPATIENT
Start: 2025-05-14 | End: 2025-05-15 | Stop reason: HOSPADM

## 2025-05-14 RX ADMIN — SODIUM CHLORIDE: 9 INJECTION, SOLUTION INTRAVENOUS at 06:05

## 2025-05-14 RX ADMIN — MORPHINE SULFATE 4 MG: 4 INJECTION INTRAVENOUS at 12:05

## 2025-05-14 RX ADMIN — OXYCODONE HYDROCHLORIDE 5 MG: 5 TABLET ORAL at 04:05

## 2025-05-14 RX ADMIN — INSULIN ASPART 1 UNITS: 100 INJECTION, SOLUTION INTRAVENOUS; SUBCUTANEOUS at 09:05

## 2025-05-14 RX ADMIN — AMLODIPINE BESYLATE 2.5 MG: 2.5 TABLET ORAL at 12:05

## 2025-05-14 RX ADMIN — HYDROXYZINE HYDROCHLORIDE 25 MG: 25 TABLET, FILM COATED ORAL at 12:05

## 2025-05-14 RX ADMIN — ONDANSETRON 4 MG: 2 INJECTION INTRAMUSCULAR; INTRAVENOUS at 01:05

## 2025-05-14 RX ADMIN — KETOROLAC TROMETHAMINE 15 MG: 30 INJECTION, SOLUTION INTRAMUSCULAR at 09:05

## 2025-05-14 NOTE — PROGRESS NOTES
[facility] ([address])  Urology  Progress Note    Patient Name: Kylah Shearer  MRN: [id number]  Admission Date: 5/13/2025  Hospital Length of Stay: 0 days  Code Status: Full Code   Attending Provider: Laura Huerta MD   Primary Care Physician: Bhavana, Primary Doctor    Subjective:     HPI:   68 y.o. female presents to the ED c/o severe waxing and waning L flank pain acute onset this afternoon. CT scan performed showed bilateral staghorn calculi with 8mm L UPJ stone. CT findings similar to large stone burden seen on CT scan in 10/2023 except for the new UPJ stone. She reports long h/o stones with several procedures in the past (ureteroscopy). Denies prior PCNL. Denies procedure since last CT 10/2023. Denies fevers.         Interval History: Afebrile, hypertensive overnight. Pain improved. NPO for IR placement of nephrostomy tube today given her large stone burden.       Objective:     Temp:  [97.9 °F (36.6 °C)-98.2 °F (36.8 °C)] 97.9 °F (36.6 °C)  Pulse:  [80-96] 80  Resp:  [18-20] 18  SpO2:  [94 %-100 %] 99 %  BP: (130-197)/(60-85) 160/74     Body mass index is 24.54 kg/m².           Drains       None                    Physical Exam  Vitals reviewed.   Constitutional:       General: She is not in acute distress.     Appearance: She is well-developed. She is not diaphoretic.   HENT:      Head: Normocephalic and atraumatic.   Pulmonary:      Effort: Pulmonary effort is normal. No respiratory distress.   Abdominal:      General: There is no distension.      Palpations: Abdomen is soft. There is no mass.      Tenderness: There is left CVA tenderness. There is no right CVA tenderness, guarding or rebound.   Musculoskeletal:         General: Normal range of motion.      Cervical back: Normal range of motion.   Skin:     General: Skin is warm and dry.      Findings: No erythema or rash.   Neurological:      Mental Status: She is alert and oriented to person, place, and time.   Psychiatric:         Behavior:  Behavior normal.           Significant Labs:    BMP:  Recent Labs   Lab 05/13/25  0916 05/14/25  0339    139   K 5.2* 4.3    110   CO2 18* 22*   BUN 19 22   CREATININE 1.1 1.0   CALCIUM 10.2 8.7       CBC:   Recent Labs   Lab 05/13/25  0916 05/14/25  0339   WBC 9.08 8.74   HGB 13.5 11.2*   HCT 40.2 35.0*    289       All pertinent labs results from the past 24 hours have been reviewed.    Significant Imaging:  All pertinent imaging results/findings from the past 24 hours have been reviewed.                  Assessment/Plan:     * Nephrolithiasis   - Large stone burden bilaterally with partial staghorn calculi also seen on CT 10/2023   - New 8mm L UPJ stone, likely cause of pain   - Recommend decompression with placement of L PCN by IR. She will need PCNL for definitive treatment of large stones. Will also need PCNL on R side at some point in future as well but can hold on R PCN placement for now.    - Long discussion re: options of placing PCN now or awaiting alessandra-op placement of PCN at time of PCNL. Suspect she will have continued pain without decompression with PCN. She is agreeable to L PCN.    - NPO for IR placement of nephrostomy tube pending availability.    - Consult placed to IR for L PCN placement    Obstruction of left ureteropelvic junction (UPJ) due to stone   - 8mm L UPJ stone    - Recommend L PCN placement by IR due to large volume stone burden and need for future PCNL        VTE Risk Mitigation (From admission, onward)           Ordered     IP VTE LOW RISK PATIENT  Once         05/13/25 1828     Place sequential compression device  Until discontinued         05/13/25 1828                    Jerardo Ro DO  Urology  [facility] ([address])

## 2025-05-14 NOTE — SUBJECTIVE & OBJECTIVE
Interval History:  Preop patient denied pain earlier in the morning.  Seen later in the evening patient reported 8/10 left flank pain along with nausea.  Her nephrostomy was draining red tinged urine.  Along with renal calculi discussed hyperglycemia and hypertension with patient.  She denies past medical history of diabetes or hypertension.  Checking A1c and started blood pressure medication.  Will discharge tomorrow with home health to monitor PCN for 1 week and a 2 week follow-up with Urology.    Review of Systems   Constitutional:  Negative for activity change, appetite change, chills and fever.   HENT:  Negative for congestion, sore throat and trouble swallowing.    Eyes:  Negative for photophobia and visual disturbance.   Respiratory:  Negative for cough, chest tightness and shortness of breath.    Cardiovascular:  Negative for chest pain, palpitations and leg swelling.   Gastrointestinal:  Positive for nausea. Negative for abdominal pain and diarrhea.   Genitourinary:  Positive for flank pain. Negative for dysuria and hematuria.   Musculoskeletal:  Negative for back pain.   Neurological:  Negative for dizziness, weakness and headaches.   Psychiatric/Behavioral:  Negative for confusion.      Objective:     Vital Signs (Most Recent):  Temp: 99.4 °F (37.4 °C) (05/14/25 1624)  Pulse: 98 (05/14/25 1624)  Resp: 16 (05/14/25 1624)  BP: (!) 140/65 (05/14/25 1624)  SpO2: 96 % (05/14/25 1624) Vital Signs (24h Range):  Temp:  [97.9 °F (36.6 °C)-99.4 °F (37.4 °C)] 99.4 °F (37.4 °C)  Pulse:  [76-98] 98  Resp:  [16-18] 16  SpO2:  [94 %-99 %] 96 %  BP: (130-191)/(60-93) 140/65     Weight: 57 kg (125 lb 10.6 oz)  Body mass index is 24.54 kg/m².    Intake/Output Summary (Last 24 hours) at 5/14/2025 1640  Last data filed at 5/14/2025 1237  Gross per 24 hour   Intake 0 ml   Output 700 ml   Net -700 ml         Physical Exam  Vitals reviewed.   Constitutional:       Appearance: Normal appearance. She is normal weight.   HENT:       Head: Normocephalic.      Mouth/Throat:      Mouth: Mucous membranes are moist.      Pharynx: Oropharynx is clear.   Eyes:      General: Lids are normal. Gaze aligned appropriately.      Conjunctiva/sclera: Conjunctivae normal.   Cardiovascular:      Rate and Rhythm: Normal rate and regular rhythm.      Pulses: Normal pulses.      Heart sounds: Normal heart sounds.   Pulmonary:      Effort: Pulmonary effort is normal.      Breath sounds: Normal breath sounds.   Abdominal:      General: Bowel sounds are normal.      Palpations: Abdomen is soft.      Tenderness: There is left CVA tenderness.   Genitourinary:     Comments: Left nephrostomy  Musculoskeletal:         General: Normal range of motion.      Cervical back: Normal range of motion.   Skin:     General: Skin is warm and dry.   Neurological:      Mental Status: She is alert and oriented to person, place, and time. Mental status is at baseline.   Psychiatric:         Mood and Affect: Mood normal.               Significant Labs: All pertinent labs within the past 24 hours have been reviewed.  BMP:   Recent Labs   Lab 05/14/25  0339   *      K 4.3      CO2 22*   BUN 22   CREATININE 1.0   CALCIUM 8.7     CBC:   Recent Labs   Lab 05/13/25  0916 05/14/25  0339   WBC 9.08 8.74   HGB 13.5 11.2*   HCT 40.2 35.0*    289     CMP:   Recent Labs   Lab 05/13/25  0916 05/14/25  0339    139   K 5.2* 4.3    110   CO2 18* 22*   * 227*   BUN 19 22   CREATININE 1.1 1.0   CALCIUM 10.2 8.7   PROT 8.7* 6.4   ALBUMIN 4.1 3.1*   BILITOT 0.2 0.2   ALKPHOS 113 76   AST 42 19   ALT 38 24   ANIONGAP 13 7*       Significant Imaging: I have reviewed all pertinent imaging results/findings within the past 24 hours.  IR Nephrostomy Tube Placement  Narrative: EXAMINATION:  Genitourinary catheter placement    Procedural Personnel    Attending physician(s): Kodak    Fellow physician(s): None    Resident physician(s): None    Advanced practice  provider(s): None    Pre-procedure diagnosis: Nephrolithiasis    Post-procedure diagnosis: Same    Indication: Urinary drainage (non-infected)    Catheter(s) placed because the previous catheter(s) became dislodged within 30 days of placement (QCDR): No    Complications: No immediate complications.    FINDINGS:  Pre-procedure    Consent: Informed consent for the procedure was obtained and time-out was performed prior to the procedure.    Preparation: The site was prepared and draped using maximal sterile barrier technique including cutaneous antisepsis.    Antibiotic administered: Prophylactic dose within 1 hour of procedure start time or 2 hours for vancomycin or fluoroquinolones    Anesthesia/sedation    Level of anesthesia/sedation: Moderate sedation (conscious sedation)    Anesthesia/sedation administered by: Independent trained observer under attending supervision with continuous monitoring of the patient's level of consciousness and physiologic status    Total intra-service sedation time (minutes): 18    Left genitourinary catheter placement    Local anesthesia was administered. A needle was advanced into the renal collecting system of the native kidney under ultrasound and fluoroscopy guidance. A wire was advanced  the tract was serially dilated and a nephrostomy tube was placed. Contrast injection was performed.    Catheter placed: 8 Thai nephrostomy    Final catheter position: Pigtail in the renal pelvis    External catheter securement: Non-absorbable suture and adhesive anchoring device    Additional findings: Large staghorn calculus    Contrast    Contrast agent: Omnipaque 350    Contrast volume (mL): 10    Additional Details    Additional description of procedure: None    Additional findings: None    Equipment details: None    Specimens removed: None. A sample was not sent for analysis.    Estimated blood loss (mL): Less than 10    Standardized report:  [email or web address]    Attestation    Signer name: Jonathandelilah    I attest that I was present for the entire procedure. I reviewed the stored images and agree with the report as written.  Impression: Left nephrostomy tube placement.    Plan:    To gravity drainage.  Nephrostomy may subsequently be converted to nephroureterostomy for upcoming PCNL.    _______________________________________________________________    PROCEDURE SUMMARY    - Image-guided placement of genitourinary catheter(s)    - Additional procedures: None    Electronically signed by: Fito Candelario MD  Date:    05/14/2025  Time:    13:47  Interventional Radiology  Procedure performed in the Invasive Lab    - See Procedure Log link below for nursing documentation    - See OpNote on Surgeries Tab for physician findings    - See Imaging Tab for radiologist dictation

## 2025-05-14 NOTE — ED NOTES
Introduction given, Pt resting in bed, aaxo4, resp even and unlabored, nad noted. Personal belongings at bedside, updated pt of on-going care, hospital policy and procedure, instructed pt to use call light for all questions and concerns. Pt verbalizes understanding has no compliant's at this time.  Call light within reach, bed placed in the lowest position.

## 2025-05-14 NOTE — ASSESSMENT & PLAN NOTE
Patient with hx of kidney stones, presenting with Left CVA tenderness.  CT renal stone showed 3 x 6 x 8 mm stone or stone fragment that has migrated into the left UPJ. No distal ureteral stone.     -urology consulted; recommendations appreciated  -IR consulted.  Left PCN placed 5/14  -1 time dose of vancomycin given postop  -p.r.n. analgesics  -will need outpatient follow-up with Urology and Internal Medicine

## 2025-05-14 NOTE — PLAN OF CARE
Patient is Aaox4 , care plan reviewed with patient .     Problem: Adult Inpatient Plan of Care  Goal: Plan of Care Review  Outcome: Progressing  Goal: Patient-Specific Goal (Individualized)  Outcome: Progressing  Goal: Absence of Hospital-Acquired Illness or Injury  Outcome: Progressing  Goal: Optimal Comfort and Wellbeing  Outcome: Progressing  Goal: Readiness for Transition of Care  Outcome: Progressing

## 2025-05-14 NOTE — SUBJECTIVE & OBJECTIVE
Interval History: Afebrile, hypertensive overnight. Pain improved. NPO for IR placement of nephrostomy tube today given her large stone burden.       Objective:     Temp:  [97.9 °F (36.6 °C)-98.2 °F (36.8 °C)] 97.9 °F (36.6 °C)  Pulse:  [80-96] 80  Resp:  [18-20] 18  SpO2:  [94 %-100 %] 99 %  BP: (130-197)/(60-85) 160/74     Body mass index is 24.54 kg/m².           Drains       None                    Physical Exam  Vitals reviewed.   Constitutional:       General: She is not in acute distress.     Appearance: She is well-developed. She is not diaphoretic.   HENT:      Head: Normocephalic and atraumatic.   Pulmonary:      Effort: Pulmonary effort is normal. No respiratory distress.   Abdominal:      General: There is no distension.      Palpations: Abdomen is soft. There is no mass.      Tenderness: There is left CVA tenderness. There is no right CVA tenderness, guarding or rebound.   Musculoskeletal:         General: Normal range of motion.      Cervical back: Normal range of motion.   Skin:     General: Skin is warm and dry.      Findings: No erythema or rash.   Neurological:      Mental Status: She is alert and oriented to person, place, and time.   Psychiatric:         Behavior: Behavior normal.           Significant Labs:    BMP:  Recent Labs   Lab 05/13/25  0916 05/14/25  0339    139   K 5.2* 4.3    110   CO2 18* 22*   BUN 19 22   CREATININE 1.1 1.0   CALCIUM 10.2 8.7       CBC:   Recent Labs   Lab 05/13/25  0916 05/14/25  0339   WBC 9.08 8.74   HGB 13.5 11.2*   HCT 40.2 35.0*    289       All pertinent labs results from the past 24 hours have been reviewed.    Significant Imaging:  All pertinent imaging results/findings from the past 24 hours have been reviewed.

## 2025-05-14 NOTE — NURSING
Pt arrived to room 342 from ED via wheelchair by transport. Pt AAOx4 and no distress noted. Fall precautions in place, bed in lowest locked position. Bed alarm on. Call light within reached and non slip socks on. IV access patent and secure. All questions and concerns addressed. Assume Care.

## 2025-05-14 NOTE — ASSESSMENT & PLAN NOTE
Patient with hx of kidney stones, presenting with Left CVA tenderness.  CT renal stone showed 3 x 6 x 8 mm stone or stone fragment that has migrated into the left UPJ. No distal ureteral stone.     -urology consulted; recommendations appreciated  -IR consulted  -IV fluids  -prn IV pain control

## 2025-05-14 NOTE — ASSESSMENT & PLAN NOTE
- Large stone burden bilaterally with partial staghorn calculi also seen on CT 10/2023   - New 8mm L UPJ stone, likely cause of pain   - Recommend decompression with placement of L PCN by IR. She will need PCNL for definitive treatment of large stones. Will also need PCNL on R side at some point in future as well but can hold on R PCN placement for now.    - Long discussion re: options of placing PCN now or awaiting alessandra-op placement of PCN at time of PCNL. Suspect she will have continued pain without decompression with PCN. She is agreeable to L PCN.    - NPO for IR placement of nephrostomy tube pending availability.    - Consult placed to IR for L PCN placement

## 2025-05-14 NOTE — HOSPITAL COURSE
Ms. Frias was admitted with multiple renal calculi and a left percutaneous nephrostomy was placed.  Her A1c warranted her a diagnosis of diabetes type 2 in diabetes education was completed.  She was also started on metformin and had endocrinology outpatient referral placed.  Ms. Montero also had persistent hypertension and was started on amlodipine.  A referral was sent to Internal Medicine so patient could establish care and have hospital follow-up.  On discharge day she was seen and examined.  Home health was ordered to monitor nephrostomy.  Follow up with Urology has been made.  She will be discharged home.  All questions and concerns were addressed, including instruction on glucometer and diabetic diet.

## 2025-05-14 NOTE — PLAN OF CARE
[facility] ([address])      HOME HEALTH ORDERS  FACE TO FACE ENCOUNTER    Patient Name: Kylah Shearer  YOB: 1955    PCP: Bhavana, Primary Doctor   PCP Address: None  PCP Phone Number: None  PCP Fax: None    Encounter Date: 5/13/25    Admit to Home Health    Diagnoses:  Active Hospital Problems    Diagnosis  POA    *Nephrolithiasis [N20.0]  Yes    Obstruction of left ureteropelvic junction (UPJ) due to stone [N20.1]  Yes      Resolved Hospital Problems   No resolved problems to display.       Follow Up Appointments:  No future appointments.    Allergies:  Review of patient's allergies indicates:   Allergen Reactions    Penicillins Anaphylaxis       Medications: Review discharge medications with patient and family and provide education.    Current Medications[1]     Medication List      You have not been prescribed any medications.           I have seen and examined this patient within the last 30 days. My clinical findings that support the need for the home health skilled services and home bound status are the following:no   Weakness/numbness causing balance and gait disturbance due to Surgery making it taxing to leave home.     Diet:   cardiac diet    Labs:  none    Referrals/ Consults  none    Activities:   activity as tolerated    Nursing:   Agency to admit patient within 24 hours of hospital discharge unless specified on physician order or at patient request    SN to complete comprehensive assessment including routine vital signs. Instruct on disease process and s/s of complications to report to MD. Review/verify medication list sent home with the patient at time of discharge  and instruct patient/caregiver as needed. Frequency may be adjusted depending on start of care date.     Skilled nurse to perform up to 3 visits PRN for symptoms related to diagnosis    Notify MD if SBP > 160 or < 90; DBP > 90 or < 50; HR > 120 or < 50; Temp > 101; O2 < 88%  Ok to schedule additional visits based on  staff availability and patient request on consecutive days within the home health episode.    When multiple disciplines ordered:    Start of Care occurs on Sunday - Wednesday schedule remaining discipline evaluations as ordered on separate consecutive days following the start of care.    Thursday SOC -schedule subsequent evaluations Friday and Monday the following week.     Friday - Saturday SOC - schedule subsequent discipline evaluations on consecutive days starting Monday of the following week.    For all post-discharge communication and subsequent orders please contact patient's primary care physician. If unable to reach primary care physician or do not receive response within 30 minutes, please contact [facility] for clinical staff order clarification    Miscellaneous   Routine Skin for Bedridden Patients: Instruct patient/caregiver to apply moisture barrier cream to all skin folds and wet areas in perineal area daily and after baths and all bowel movements.    Home Health Aide:  Nursing Twice weekly  to clean/monitor nephrostomy.    Wound Care Orders  no    I certify that this patient is confined to her home and needs intermittent skilled nursing care.               [1]   Current Facility-Administered Medications   Medication Dose Route Frequency Provider Last Rate Last Admin    0.9% NaCl infusion   Intravenous Continuous Jana Argueta  mL/hr at 05/14/25 0622 New Bag at 05/14/25 0622    acetaminophen tablet 650 mg  650 mg Oral Q8H PRN Jana Argueta NP        amLODIPine tablet 2.5 mg  2.5 mg Oral Daily Sara Barrett DNP   2.5 mg at 05/14/25 1232    hydrOXYzine HCL tablet 25 mg  25 mg Oral TID PRN Sara Barrett DNP   25 mg at 05/14/25 1232    ketorolac injection 15 mg  15 mg Intravenous Q6H PRN Jana Argueta NP        melatonin tablet 6 mg  6 mg Oral Nightly PRN John Todd PA-C        morphine injection 4 mg  4 mg Intravenous Q4H PRN Jana Argueta NP   4 mg at 05/14/25  1258    naloxone 0.4 mg/mL injection 0.02 mg  0.02 mg Intravenous PRN Jana Argueta NP        ondansetron injection 4 mg  4 mg Intravenous Q6H PRN Jana Argueta NP   4 mg at 05/14/25 1302    prochlorperazine injection Soln 5 mg  5 mg Intravenous Q6H PRN Jana Argueta NP        sodium chloride 0.9% flush 10 mL  10 mL Intravenous Q8H PRN Jana Argueta NP        vancomycin 1 g in dextrose 5 % 250 mL IVPB (ready to mix)    Continuous PRN Fito Candelario MD   1,000 mg at 05/14/25 1105

## 2025-05-14 NOTE — CONSULTS
Consult/H&P Note  Interventional Radiology    Consult Requested By: Urology    Reason for Consult: nephrolithiasis    SUBJECTIVE:     Chief Complaint: L flank pain    History of Present Illness: 68 yo F wioth B renal staghorn calculi, presents with L flank pain, found to have L ureteral stone.    Past Medical History:   Diagnosis Date    Kidney stones      Past Surgical History:   Procedure Laterality Date     SECTION       Family History   Problem Relation Name Age of Onset    Diabetes Mother      Diabetes Father       Social History[1]    Review of Systems:  Constitutional/General:Negative for fever.  Hematological/Immuno: no known coagulopathies  Respiratory: no shortness of breath  Cardiovascular: no chest pain  Gastrointestinal: no abdominal pain  Genito-Urinary: positive for - dysuria  Musculoskeletal: negative  Skin: Negative for rash, itching, pigmentation changes, nail or hair changes.  Neurological: no TIA or stroke symptoms  Psychiatric: normal mood/affect, good insight/judgement      OBJECTIVE:     Vital Signs Range (Last 24H):  Temp:  [97.9 °F (36.6 °C)-98.2 °F (36.8 °C)]   Pulse:  [76-92]   Resp:  [16-20]   BP: (130-177)/(60-84)   SpO2:  [94 %-100 %]     Physical Exam:  General- Patient alert and oriented x3 in NAD  ENT- PERRLA,  Neck- No masses  CV- Regular rate and rhythm  Resp-  No increased WOB  GI- Non tender/non-distended  Extrem- No cyanosis, clubbing, edema.   Derm- No rashes, masses, or lesions noted  Neuro-  No focal deficits noted.     Physical Exam  Body mass index is 24.54 kg/m².    Scheduled Meds:   Continuous Infusions:    0.9% NaCl   Intravenous Continuous 100 mL/hr at 25 0622 New Bag at 25 0622     PRN Meds:  Current Facility-Administered Medications:     acetaminophen, 650 mg, Oral, Q8H PRN    ketorolac, 15 mg, Intravenous, Q6H PRN    melatonin, 6 mg, Oral, Nightly PRN    morphine, 4 mg, Intravenous, Q4H PRN    naloxone, 0.02 mg, Intravenous, PRN    ondansetron, 4  "mg, Intravenous, Q6H PRN    prochlorperazine, 5 mg, Intravenous, Q6H PRN    sodium chloride 0.9%, 10 mL, Intravenous, Q8H PRN    Allergies:   Review of patient's allergies indicates:   Allergen Reactions    Penicillins Anaphylaxis       Labs:  No results for input(s): "INR", "PT", "PTT" in the last 168 hours.    Recent Labs   Lab 05/14/25 0339   WBC 8.74   HGB 11.2*   HCT 35.0*   MCV 92         Recent Labs   Lab 05/14/25 0339   *      K 4.3      CO2 22*   BUN 22   CREATININE 1.0   CALCIUM 8.7   ALT 24   AST 19   ALBUMIN 3.1*   BILITOT 0.2       Vitals (Most Recent):  Temp: 98 °F (36.7 °C) (05/14/25 0718)  Pulse: 76 (05/14/25 0718)  Resp: 16 (05/14/25 0718)  BP: (!) 166/77 (05/14/25 0718)  SpO2: 99 % (05/14/25 0718)    ASA: 2  Mallampati: 2    Consent obtained    ASSESSMENT/PLAN:     L nephrostomy placement. Moderate sedation.    Active Hospital Problems    Diagnosis  POA    *Nephrolithiasis [N20.0]  Yes    Obstruction of left ureteropelvic junction (UPJ) due to stone [N20.1]  Yes      Resolved Hospital Problems   No resolved problems to display.           Fito Candelario MD        [1]   Social History  Tobacco Use    Smoking status: Never    Smokeless tobacco: Never   Substance Use Topics    Alcohol use: Yes    Drug use: Never     "

## 2025-05-14 NOTE — PLAN OF CARE
Case Management Assessment     PCP: None  [facility]    Patient Arrived From: home  Existing Help at Home: independent    Barriers to Discharge: none    Discharge Plan:    A. Home   B. Home    Lives alone - independent - daughter will provide transportation home     [facility] ([address])  Initial Discharge Assessment       Primary Care Provider: Bhavana, Primary Doctor    Admission Diagnosis: Ureterolithiasis [N20.1]  Chest pain [R07.9]    Admission Date: 5/13/2025  Expected Discharge Date: 5/15/2025    Transition of Care Barriers: None    Payor: [employer] / Plan: [employer] CHOICE / Product Type: Commercial /     Extended Emergency Contact Information  Primary Emergency Contact: roxana lesly  Mobile Phone: [phone]  Relation: Daughter    Discharge Plan A: Home  Discharge Plan B: Home      [id number] [place], [place] - [address]  [address] [address]  Phone: [phone] Fax: [phone]      Initial Assessment (most recent)       Adult Discharge Assessment - 05/14/25 1233          Discharge Assessment    Assessment Type Discharge Planning Assessment     Confirmed/corrected address, phone number and insurance Yes     Confirmed Demographics Correct on Facesheet     Source of Information family     Does patient/caregiver understand observation status Yes     Communicated MAXIME with patient/caregiver Yes     People in Home alone     Do you expect to return to your current living situation? Yes     Do you have help at home or someone to help you manage your care at home? Yes     Prior to hospitilization cognitive status: Alert/Oriented     Current cognitive status: Alert/Oriented     Walking or Climbing Stairs Difficulty no     Dressing/Bathing Difficulty no     Equipment Currently Used at Home none     Readmission within 30 days? No     Patient currently being followed by outpatient case management? No     Do you currently have service(s) that  help you manage your care at home? No     Do you take prescription medications? No     Do you have prescription coverage? Yes     Who is going to help you get home at discharge? daughter     How do you get to doctors appointments? car, drives self     Are you on dialysis? No     Do you take coumadin? No     Discharge Plan A Home     Discharge Plan B Home     DME Needed Upon Discharge  none     Discharge Plan discussed with: Adult children     Transition of Care Barriers None

## 2025-05-14 NOTE — H&P
[facility] [address] Medicine  History & Physical    Patient Name: Kylah Shearer  MRN: [id number]  Patient Class: OP- Observation  Admission Date: 2025  Attending Physician: Laura Huerta MD   Primary Care Provider: Bhavana Primary Doctor         Patient information was obtained from patient, past medical records, and ER records.     Subjective:     Principal Problem:<principal problem not specified>    Chief Complaint:   Chief Complaint   Patient presents with    Flank Pain     L flank pain x20 min PTA, Hx of kidney stones.         HPI: Kylah Shearer is a 69 year old female with a past medical history of kidney stones who presents with left flank pain that started earlier today. Patient states pain continued to worsen throughout the day prompting her to come to the ED. She reports hx of kidney stones with several procedures in the past (ureteroscopy). Denies prior PCNL. Denies procedure since last CT 10/2023.   She reports associated vomiting with the pain. Patient denies fever, chills and any urinary symptoms.     ED work up significant for CT renal stone study that showed bilateral staghorn calculi with 8mm L UPJ stone. CT findings similar to large stone burden seen on CT scan in 10/2023 except for the new UPJ stone. Patient received IV pain medication and urology was consulted. Patient was evaluated by urology and IR consulted. Patient referred to hospital medicine and will be admitted for further evaluation and management.     Past Medical History:   Diagnosis Date    Kidney stones        Past Surgical History:   Procedure Laterality Date     SECTION         Review of patient's allergies indicates:   Allergen Reactions    Penicillins Anaphylaxis       No current facility-administered medications on file prior to encounter.     Current Outpatient Medications on File Prior to Encounter   Medication Sig    docusate sodium (COLACE) 100 MG capsule Take 1 capsule (100 mg total)  by mouth 2 (two) times daily as needed for Constipation.    HYDROcodone-acetaminophen (NORCO) 5-325 mg per tablet Take 1 tablet by mouth every 6 (six) hours as needed for Pain.    ondansetron (ZOFRAN-ODT) 4 MG TbDL Take 1 tablet (4 mg total) by mouth every 8 (eight) hours as needed.    tamsulosin (FLOMAX) 0.4 mg Cap Take 1 capsule (0.4 mg total) by mouth once daily.     Family History       Problem Relation (Age of Onset)    Diabetes Mother, Father          Tobacco Use    Smoking status: Never    Smokeless tobacco: Never   Substance and Sexual Activity    Alcohol use: Yes    Drug use: Never    Sexual activity: Not on file     Review of Systems   Constitutional:  Negative for activity change, appetite change, chills and fever.   HENT:  Negative for congestion, sore throat and trouble swallowing.    Eyes:  Negative for photophobia and visual disturbance.   Respiratory:  Negative for cough, chest tightness and shortness of breath.    Cardiovascular:  Negative for chest pain, palpitations and leg swelling.   Gastrointestinal:  Negative for abdominal pain, diarrhea and nausea.   Genitourinary:  Positive for flank pain. Negative for dysuria and hematuria.   Musculoskeletal:  Negative for back pain.   Neurological:  Negative for dizziness, weakness and headaches.   Psychiatric/Behavioral:  Negative for confusion.      Objective:     Vital Signs (Most Recent):  Temp: 98.2 °F (36.8 °C) (05/13/25 1218)  Pulse: 84 (05/13/25 1400)  Resp: 20 (05/13/25 1218)  BP: (!) 141/63 (05/13/25 1400)  SpO2: 98 % (05/13/25 1400) Vital Signs (24h Range):  Temp:  [98.2 °F (36.8 °C)] 98.2 °F (36.8 °C)  Pulse:  [80-96] 84  Resp:  [18-20] 20  SpO2:  [97 %-100 %] 98 %  BP: (141-197)/(63-85) 141/63     Weight: 54.4 kg (120 lb)  Body mass index is 21.95 kg/m².     Physical Exam  Vitals reviewed.   Constitutional:       Appearance: Normal appearance. She is normal weight.   HENT:      Head: Normocephalic.      Mouth/Throat:      Mouth: Mucous  membranes are moist.      Pharynx: Oropharynx is clear.   Eyes:      General: Lids are normal. Gaze aligned appropriately.      Conjunctiva/sclera: Conjunctivae normal.   Cardiovascular:      Rate and Rhythm: Normal rate and regular rhythm.      Pulses: Normal pulses.      Heart sounds: Normal heart sounds.   Pulmonary:      Effort: Pulmonary effort is normal.      Breath sounds: Normal breath sounds.   Abdominal:      General: Bowel sounds are normal.      Palpations: Abdomen is soft.      Tenderness: There is left CVA tenderness.   Musculoskeletal:         General: Normal range of motion.      Cervical back: Normal range of motion.   Skin:     General: Skin is warm and dry.   Neurological:      Mental Status: She is alert and oriented to person, place, and time. Mental status is at baseline.   Psychiatric:         Mood and Affect: Mood normal.                Significant Labs: All pertinent labs within the past 24 hours have been reviewed.  CBC:   Recent Labs   Lab 05/13/25  0916   WBC 9.08   HGB 13.5   HCT 40.2        CMP:   Recent Labs   Lab 05/13/25  0916      K 5.2*      CO2 18*   *   BUN 19   CREATININE 1.1   CALCIUM 10.2   PROT 8.7*   ALBUMIN 4.1   BILITOT 0.2   ALKPHOS 113   AST 42   ALT 38   ANIONGAP 13       Significant Imaging: I have reviewed all pertinent imaging results/findings within the past 24 hours.  Imaging Results              CT Renal Stone Study ABD Pelvis WO (Final result)  Result time 05/13/25 09:56:52      Final result by Linwood Christensen Jr., MD (05/13/25 09:56:52)                   Impression:      Bilateral intrarenal collecting system staghorn and non staghornd stones, overall similar compared with the prior exam with the exception of a stone or stone fragment that has migrated into the left UPJ.  Intrarenal collecting system dilatation versus peripelvic renal cysts appears similar to the prior exam.      Electronically signed by: Linwood Marley  Jr  Date:    05/13/2025  Time:    09:56               Narrative:    EXAMINATION:  CT RENAL STONE STUDY ABD PELVIS WO    CLINICAL HISTORY:  Flank pain, kidney stone suspected;    TECHNIQUE:  Low dose axial images, sagittal and coronal reformations were obtained from the lung bases to the pubic symphysis.  Contrast was not administered.    COMPARISON:  Similar study 10 17 23.    FINDINGS:  Lung Bases: Bibasilar pleuroparenchymal lung scarring.  Moderate-sized sliding hiatal hernia.    Kidneys/ Ureters: There is redemonstration of bilateral staghorn intrarenal calculi as well as numerous punctate subcentimeter intrarenal calculi bilaterally.  There is stable dilatation of the renal collecting systems versus peripelvic cysts bilaterally.  There is a 3 x 6 x 8 mm stone or stone fragment that has migrated into the left UPJ.  No distal ureteral stone.    Liver: Normal in size and attenuation, with no focal hepatic lesions.    Gallbladder: No calcified gallstones.    Bile Ducts: No evidence of dilated ducts.    Pancreas: No mass or peripancreatic fat stranding.    Spleen: Unremarkable.    Adrenals: Unremarkable.    Pelvic organs: Unremarkable.    GI Tract/Mesentery: No evidence of bowel obstruction or inflammation.    Retroperitoneum: No significant adenopathy.    Vasculature: No significant atherosclerosis or aneurysm.    Bones: Unremarkable.                                      Assessment/Plan:     Assessment & Plan  Obstruction of left ureteropelvic junction (UPJ) due to stone  Patient with hx of kidney stones, presenting with Left CVA tenderness.  CT renal stone showed 3 x 6 x 8 mm stone or stone fragment that has migrated into the left UPJ. No distal ureteral stone.     -urology consulted; recommendations appreciated  -IR consulted  -IV fluids  -prn IV pain control    VTE Risk Mitigation (From admission, onward)           Ordered     IP VTE LOW RISK PATIENT  Once         05/13/25 1828     Place sequential compression  device  Until discontinued         05/13/25 1828                         On 05/13/2025, patient should be placed in hospital observation services        Jana Argueta NP  Department of Hospital Medicine  [facility] - [facility] ([address])

## 2025-05-15 VITALS
RESPIRATION RATE: 18 BRPM | DIASTOLIC BLOOD PRESSURE: 75 MMHG | HEART RATE: 80 BPM | OXYGEN SATURATION: 97 % | HEIGHT: 60 IN | BODY MASS INDEX: 24.68 KG/M2 | SYSTOLIC BLOOD PRESSURE: 137 MMHG | TEMPERATURE: 99 F | WEIGHT: 125.69 LBS

## 2025-05-15 LAB
ABSOLUTE EOSINOPHIL (OHS): 0.22 K/UL
ABSOLUTE MONOCYTE (OHS): 0.7 K/UL (ref 0.3–1)
ABSOLUTE NEUTROPHIL COUNT (OHS): 4.57 K/UL (ref 1.8–7.7)
ALBUMIN SERPL BCP-MCNC: 3.1 G/DL (ref 3.5–5.2)
ALP SERPL-CCNC: 65 UNIT/L (ref 40–150)
ALT SERPL W/O P-5'-P-CCNC: 19 UNIT/L (ref 10–44)
ANION GAP (OHS): 7 MMOL/L (ref 8–16)
AST SERPL-CCNC: 14 UNIT/L (ref 11–45)
BASOPHILS # BLD AUTO: 0.03 K/UL
BASOPHILS NFR BLD AUTO: 0.4 %
BILIRUB SERPL-MCNC: 0.3 MG/DL (ref 0.1–1)
BUN SERPL-MCNC: 16 MG/DL (ref 8–23)
CALCIUM SERPL-MCNC: 8.7 MG/DL (ref 8.7–10.5)
CHLORIDE SERPL-SCNC: 110 MMOL/L (ref 95–110)
CO2 SERPL-SCNC: 21 MMOL/L (ref 23–29)
CREAT SERPL-MCNC: 1 MG/DL (ref 0.5–1.4)
ERYTHROCYTE [DISTWIDTH] IN BLOOD BY AUTOMATED COUNT: 12.8 % (ref 11.5–14.5)
GFR SERPLBLD CREATININE-BSD FMLA CKD-EPI: >60 ML/MIN/1.73/M2
GLUCOSE SERPL-MCNC: 199 MG/DL (ref 70–110)
HCT VFR BLD AUTO: 33.5 % (ref 37–48.5)
HGB BLD-MCNC: 11 GM/DL (ref 12–16)
IMM GRANULOCYTES # BLD AUTO: 0.05 K/UL (ref 0–0.04)
IMM GRANULOCYTES NFR BLD AUTO: 0.6 % (ref 0–0.5)
LYMPHOCYTES # BLD AUTO: 2.24 K/UL (ref 1–4.8)
MCH RBC QN AUTO: 29.4 PG (ref 27–31)
MCHC RBC AUTO-ENTMCNC: 32.8 G/DL (ref 32–36)
MCV RBC AUTO: 90 FL (ref 82–98)
NUCLEATED RBC (/100WBC) (OHS): 0 /100 WBC
PLATELET # BLD AUTO: 267 K/UL (ref 150–450)
PMV BLD AUTO: 9 FL (ref 9.2–12.9)
POCT GLUCOSE: 201 MG/DL (ref 70–110)
POCT GLUCOSE: 214 MG/DL (ref 70–110)
POTASSIUM SERPL-SCNC: 4.1 MMOL/L (ref 3.5–5.1)
PROT SERPL-MCNC: 6.4 GM/DL (ref 6–8.4)
RBC # BLD AUTO: 3.74 M/UL (ref 4–5.4)
RELATIVE EOSINOPHIL (OHS): 2.8 %
RELATIVE LYMPHOCYTE (OHS): 28.7 % (ref 18–48)
RELATIVE MONOCYTE (OHS): 9 % (ref 4–15)
RELATIVE NEUTROPHIL (OHS): 58.5 % (ref 38–73)
SODIUM SERPL-SCNC: 138 MMOL/L (ref 136–145)
WBC # BLD AUTO: 7.81 K/UL (ref 3.9–12.7)

## 2025-05-15 PROCEDURE — 63600175 PHARM REV CODE 636 W HCPCS: Mod: JZ,TB | Performed by: NURSE PRACTITIONER

## 2025-05-15 PROCEDURE — 36415 COLL VENOUS BLD VENIPUNCTURE: CPT | Performed by: NURSE PRACTITIONER

## 2025-05-15 PROCEDURE — 96372 THER/PROPH/DIAG INJ SC/IM: CPT | Performed by: NURSE PRACTITIONER

## 2025-05-15 PROCEDURE — G0378 HOSPITAL OBSERVATION PER HR: HCPCS

## 2025-05-15 PROCEDURE — 25000003 PHARM REV CODE 250: Performed by: NURSE PRACTITIONER

## 2025-05-15 PROCEDURE — 94761 N-INVAS EAR/PLS OXIMETRY MLT: CPT

## 2025-05-15 PROCEDURE — 85025 COMPLETE CBC W/AUTO DIFF WBC: CPT | Performed by: NURSE PRACTITIONER

## 2025-05-15 PROCEDURE — 80053 COMPREHEN METABOLIC PANEL: CPT | Performed by: NURSE PRACTITIONER

## 2025-05-15 PROCEDURE — 63600175 PHARM REV CODE 636 W HCPCS: Performed by: NURSE PRACTITIONER

## 2025-05-15 RX ORDER — AMLODIPINE BESYLATE 2.5 MG/1
2.5 TABLET ORAL DAILY
Qty: 30 TABLET | Refills: 0 | Status: SHIPPED | OUTPATIENT
Start: 2025-05-16 | End: 2025-05-21 | Stop reason: SDUPTHER

## 2025-05-15 RX ORDER — BLOOD-GLUCOSE CONTROL, NORMAL
EACH MISCELLANEOUS
Qty: 100 EACH | Refills: 0 | Status: SHIPPED | OUTPATIENT
Start: 2025-05-15

## 2025-05-15 RX ORDER — HYDROXYZINE HYDROCHLORIDE 25 MG/1
25 TABLET, FILM COATED ORAL 3 TIMES DAILY PRN
Qty: 21 TABLET | Refills: 0 | Status: SHIPPED | OUTPATIENT
Start: 2025-05-15 | End: 2025-05-21

## 2025-05-15 RX ORDER — DEXTROSE 4 G
TABLET,CHEWABLE ORAL
Qty: 1 EACH | Refills: 0 | Status: SHIPPED | OUTPATIENT
Start: 2025-05-15

## 2025-05-15 RX ORDER — METFORMIN HYDROCHLORIDE 500 MG/1
500 TABLET, EXTENDED RELEASE ORAL
Qty: 30 TABLET | Refills: 0 | Status: SHIPPED | OUTPATIENT
Start: 2025-05-15 | End: 2025-05-21

## 2025-05-15 RX ADMIN — KETOROLAC TROMETHAMINE 15 MG: 30 INJECTION, SOLUTION INTRAMUSCULAR at 04:05

## 2025-05-15 RX ADMIN — AMLODIPINE BESYLATE 2.5 MG: 2.5 TABLET ORAL at 08:05

## 2025-05-15 RX ADMIN — INSULIN ASPART 2 UNITS: 100 INJECTION, SOLUTION INTRAVENOUS; SUBCUTANEOUS at 01:05

## 2025-05-15 RX ADMIN — INSULIN ASPART 2 UNITS: 100 INJECTION, SOLUTION INTRAVENOUS; SUBCUTANEOUS at 08:05

## 2025-05-15 NOTE — ASSESSMENT & PLAN NOTE
- Large stone burden bilaterally with partial staghorn calculi also seen on CT 10/2023   - New 8mm L UPJ stone, likely cause of pain   - She will need PCNL for definitive treatment of large stones. Will also need PCNL on R side at some point in future as well but can hold on R PCN placement for now.    - Long discussion re: options of placing PCN now or awaiting alessandra-op placement of PCN at time of PCNL. Suspect she will have continued pain without    - S/p L nephrostomy tube placement on 5/14.    - Ucx from 5/13/25 with no growth   - Messaged for outpatient follow up to schedule definitive stone management   - Ok for discharge from Urologic perspective   - Rest of care per primary

## 2025-05-15 NOTE — PLAN OF CARE
[facility] contacted [facility] and stated they are not able to accept patient now and did not realize she was a Medicaid patient. [facility] spoke with [facility] who is able to accept patient. [facility] provided them with her daughter's address.

## 2025-05-15 NOTE — PLAN OF CARE
Case Management Final Discharge Note    Discharge Disposition: Home with HH     New DME ordered / company name: NA    Relevant SDOH / Transition of Care Barriers:  NA    Person available to provide assistance at home when needed and their contact information: Self or daughter     Scheduled followup appointment: Urology     Referrals placed: Internal Medicine and endo     Transportation: Son in law     Patient educated on discharge services and updated on DC plan. Bedside RN notified, patient clear to discharge from Case Management Perspective.     Met with patient to review discharge recommendation of HH and is agreeable to plan    Patient/family provided list of facilities in-network with patient's payor plan. Providers that are owned, operated, or affiliated with [facility] are included on the list.     Patient has declined to select a preferred provider and elects placement with the first accepting in network provider that is available to provide services as ordered by the physician       If an additional preferred facility not listed above is identified, additional referral to be sent. If above facilities unable to accept, will send additional referrals to in-network providers.     [facility] accepted patient. Sw updated [facility] the patient will dc with her daughter at [address] [place] [address].   [facility] - [facility] ([address])  Discharge Final Note    Primary Care Provider: No, Primary Doctor    Expected Discharge Date: 5/15/2025    Final Discharge Note (most recent)       Final Note - 05/15/25 1347          Final Note    Assessment Type Final Discharge Note (P)      Anticipated Discharge Disposition Home-Health Care Svc (P)      Hospital Resources/Appts/Education Provided Provided patient/caregiver with written discharge plan information;Appointments scheduled and added to AVS;Appointment suggestion unavailable (P)         Post-Acute Status    Post-Acute Authorization Placement (P)       Post-Acute Placement Status Set-up Complete/Auth obtained (P)      Discharge Delays None known at this time (P)                      After-discharge care                Home Medical Care       [facility]   Service: Home Health Services    [address] [place] [address], [address]  [place] [address]   Phone: [phone]       Next Steps: Follow up on 5/16/2025    Instructions: They will contact you for home healthcare appointments.

## 2025-05-15 NOTE — SUBJECTIVE & OBJECTIVE
Interval History: Afebrile, other VSS overnight. Had nausea, vomiting yesterday afternoon that has since resolved. L. Nephrostomy tube with thin red output.     L. /-  UOP: 1400/700       Objective:     Temp:  [97.9 °F (36.6 °C)-99.4 °F (37.4 °C)] 98 °F (36.7 °C)  Pulse:  [75-98] 75  Resp:  [16-18] 18  SpO2:  [95 %-99 %] 96 %  BP: (117-191)/(58-93) 131/72     Body mass index is 24.54 kg/m².           Drains       Drain  Duration                  Nephrostomy 05/14/25 1051 Right 8 Fr. <1 day                     Physical Exam  Vitals reviewed.   Constitutional:       General: She is not in acute distress.     Appearance: She is well-developed. She is not diaphoretic.   HENT:      Head: Normocephalic and atraumatic.   Pulmonary:      Effort: Pulmonary effort is normal. No respiratory distress.   Abdominal:      General: There is no distension.      Palpations: Abdomen is soft. There is no mass.      Tenderness: There is no right CVA tenderness, left CVA tenderness, guarding or rebound.      Comments: LNT draining thin red urine.    Musculoskeletal:         General: Normal range of motion.      Cervical back: Normal range of motion.   Skin:     General: Skin is warm and dry.      Findings: No erythema or rash.   Neurological:      Mental Status: She is alert and oriented to person, place, and time.   Psychiatric:         Behavior: Behavior normal.           Significant Labs:    BMP:  Recent Labs   Lab 05/13/25  0916 05/14/25  0339    139   K 5.2* 4.3    110   CO2 18* 22*   BUN 19 22   CREATININE 1.1 1.0   CALCIUM 10.2 8.7       CBC:   Recent Labs   Lab 05/13/25  0916 05/14/25  0339   WBC 9.08 8.74   HGB 13.5 11.2*   HCT 40.2 35.0*    289       All pertinent labs results from the past 24 hours have been reviewed.    Significant Imaging:  All pertinent imaging results/findings from the past 24 hours have been reviewed.

## 2025-05-15 NOTE — PROGRESS NOTES
[facility] - [facility] S[facility] ([address])  Urology  Progress Note    Patient Name: Kylah Shearer  MRN: [id number]  Admission Date: 5/13/2025  Hospital Length of Stay: 0 days  Code Status: Full Code   Attending Provider: Laura Huerta MD   Primary Care Physician: Bhavana, Primary Doctor    Subjective:     HPI:   68 y.o. female presents to the ED c/o severe waxing and waning L flank pain acute onset this afternoon. CT scan performed showed bilateral staghorn calculi with 8mm L UPJ stone. CT findings similar to large stone burden seen on CT scan in 10/2023 except for the new UPJ stone. She reports long h/o stones with several procedures in the past (ureteroscopy). Denies prior PCNL. Denies procedure since last CT 10/2023. Denies fevers.         Interval History: Afebrile, other VSS overnight. Had nausea, vomiting yesterday afternoon that has since resolved. L. Nephrostomy tube with thin red output.     L. /-  UOP: 1400/700       Objective:     Temp:  [97.9 °F (36.6 °C)-99.4 °F (37.4 °C)] 98 °F (36.7 °C)  Pulse:  [75-98] 75  Resp:  [16-18] 18  SpO2:  [95 %-99 %] 96 %  BP: (117-191)/(58-93) 131/72     Body mass index is 24.54 kg/m².           Drains       Drain  Duration                  Nephrostomy 05/14/25 1051 Right 8 Fr. <1 day                     Physical Exam  Vitals reviewed.   Constitutional:       General: She is not in acute distress.     Appearance: She is well-developed. She is not diaphoretic.   HENT:      Head: Normocephalic and atraumatic.   Pulmonary:      Effort: Pulmonary effort is normal. No respiratory distress.   Abdominal:      General: There is no distension.      Palpations: Abdomen is soft. There is no mass.      Tenderness: There is no right CVA tenderness, left CVA tenderness, guarding or rebound.      Comments: LNT draining thin red urine.    Musculoskeletal:         General: Normal range of motion.      Cervical back: Normal range of motion.   Skin:     General: Skin is warm and dry.       Findings: No erythema or rash.   Neurological:      Mental Status: She is alert and oriented to person, place, and time.   Psychiatric:         Behavior: Behavior normal.           Significant Labs:    BMP:  Recent Labs   Lab 05/13/25  0916 05/14/25  0339    139   K 5.2* 4.3    110   CO2 18* 22*   BUN 19 22   CREATININE 1.1 1.0   CALCIUM 10.2 8.7       CBC:   Recent Labs   Lab 05/13/25  0916 05/14/25  0339   WBC 9.08 8.74   HGB 13.5 11.2*   HCT 40.2 35.0*    289       All pertinent labs results from the past 24 hours have been reviewed.    Significant Imaging:  All pertinent imaging results/findings from the past 24 hours have been reviewed.                  Assessment/Plan:     * Nephrolithiasis   - Large stone burden bilaterally with partial staghorn calculi also seen on CT 10/2023   - New 8mm L UPJ stone, likely cause of pain   - She will need PCNL for definitive treatment of large stones. Will also need PCNL on R side at some point in future as well but can hold on R PCN placement for now.    - Long discussion re: options of placing PCN now or awaiting alessandra-op placement of PCN at time of PCNL. Suspect she will have continued pain without    - S/p L nephrostomy tube placement on 5/14.    - Ucx from 5/13/25 with no growth   - Messaged for outpatient follow up to schedule definitive stone management   - Ok for discharge from Urologic perspective   - Rest of care per primary     Obstruction of left ureteropelvic junction (UPJ) due to stone   - 8mm L UPJ stone           VTE Risk Mitigation (From admission, onward)           Ordered     IP VTE LOW RISK PATIENT  Once         05/13/25 1828     Place sequential compression device  Until discontinued         05/13/25 1828                    Jerardo Ro DO  Urology  [facility] - Med Surg ([address])

## 2025-05-15 NOTE — PLAN OF CARE
Problem: Adult Inpatient Plan of Care  Goal: Plan of Care Review  Outcome: Progressing  Goal: Patient-Specific Goal (Individualized)  Outcome: Progressing  Goal: Absence of Hospital-Acquired Illness or Injury  Outcome: Progressing  Goal: Optimal Comfort and Wellbeing  Outcome: Progressing   POC reviewed with pt. A&Ox4. Room air. No acute changes. BG monitored. PRN medication administered for pain. Safety checks performed.

## 2025-05-15 NOTE — CONSULTS
"Pt seen by diabetes education.     Newly diagnosed with diabetes.     Current A1C 9.3%  Receiving SSI Novolog in hospital     RD had just finished nutrition education at time of visit.   Today's education focused on pathophysiology of diabetes, basic self-management skills, and SMBG.     Ed on what DM is, insulin resistance, beta cell burnout  Ed on importance of using lifestyle changes + appropriate medications to control BG and slow disease progression      Ed on importance of SMBG  Reviewed type of meter and technical usage   Ed on prescribed SMBG schedule and goal BG   Ed on using BG trends to track progress of routine changes   Encouraged following up with PCP for review of home BGs and medical management    Ed on how improved POC BG values translate to improved A1C - ed on current A1C and goal A1C and when next A1C is due      Pt expressed does not want to be sticking herself daily and interested in personal CGM. Explained insurance only covers CGM if will be on at least 1 injection of insulin daily. However, Pete 3 Plus sensor does have a coupon card to reduce out of pocket to ~$75/month. Encouraged discussing with PCP for Rx.     Pt reports was "eating all the wrong things" and drinking a lot of fruit juice prior to admission. She is motivated to make changes. Reinforced importance of carb portion control and avoiding sugary drinks. Also reviewed ADA rec's for physical activity.     Pt would benefit from outpatient diabetes education follow-up. She prefers to call when ready. Contact information provided.   "

## 2025-05-15 NOTE — CONSULTS
Nutrition-Related Diabetes Education      Time Spent: 30 min  Learners: patient  Current HbA1c: 9.3    Nutrition Related Social Determinants of Health: SDOH: Adequate food in home environment  Social Drivers of Health     Food Insecurity: No Food Insecurity (5/14/2025)    Hunger Vital Sign     Worried About Running Out of Food in the Last Year: Never true     Ran Out of Food in the Last Year: Never true     Is patient aware of their A1c and their goal A1c?  Yes    Home diabetes medication(s):  Diabetes Medications              metFORMIN (GLUCOPHAGE-XR) 500 MG ER 24hr tablet Take 1 tablet (500 mg total) by mouth daily with breakfast.          Nutrition Education with handouts:   Planning Healthy Meals    Comments:  Patient with newly diagnosed diabetes with recent A1c 9.3. Currently tolerating a consistent carbohydrate diet. No GI intolerances reported to RD. Pt not aware of elevated blood glucose prior to diagnosis. Educated on carbohydrate controlled diet r/t diabetes. Pt typically drinks juice and enjoys [employer] chocolates. Does not have a consistent meal intake. Sometimes skips meals. Reviewed carbohydrate and non carbohydrate foods. Discussed avoiding sugar sweetened beverages including juices and advised on zero kcal/diet beverages with emphasis on water consumption. Educated on food nutrition label, portion sizes of carbohydrate and not eating carbohydrate alone. Educated on carbohydrate counting. Encouraged complete meals throughout the day for breakfast, lunch and dinner. Advised using the plate method as a template to monitor carbohydrate intake. Encouraged increase in non starchy vegetables and whole grains over refined grains. Provided handout and pt voiced understanding.         Barriers to Learning: none identified     Follow up: yes    Please consult as needed.    Thank you!  Nedra Noe RDN, J LUISN

## 2025-05-15 NOTE — DISCHARGE SUMMARY
[facility] ([address] Medicine  Discharge Summary      Patient Name: Kylah Shearer  MRN: [id number]  TORI: [id number]  Patient Class: OP- Observation  Admission Date: 5/13/2025  Hospital Length of Stay: 0 days  Discharge Date and Time: 05/15/2025 4:53 PM  Attending Physician: Bhavana att. providers found   Discharging Provider: Sara Barrett DNP  Primary Care Provider: Bhavana Primary Doctor    Primary Care Team: Networked reference to record PCT     HPI:   Kylah Shearer is a 69 year old female with a past medical history of kidney stones who presents with left flank pain that started earlier today. Patient states pain continued to worsen throughout the day prompting her to come to the ED. She reports hx of kidney stones with several procedures in the past (ureteroscopy). Denies prior PCNL. Denies procedure since last CT 10/2023.   She reports associated vomiting with the pain. Patient denies fever, chills and any urinary symptoms.     ED work up significant for CT renal stone study that showed bilateral staghorn calculi with 8mm L UPJ stone. CT findings similar to large stone burden seen on CT scan in 10/2023 except for the new UPJ stone. Patient received IV pain medication and urology was consulted. Patient was evaluated by urology and IR consulted. Patient referred to hospital medicine and will be admitted for further evaluation and management.     Procedure(s) (LRB):  Creation, Nephrostomy, Percutaneous (Left)      Hospital Course:   Ms. Frias was admitted with multiple renal calculi and a left percutaneous nephrostomy was placed.  Her A1c warranted her a diagnosis of diabetes type 2 in diabetes education was completed.  She was also started on metformin and had endocrinology outpatient referral placed.  Ms. Montero also had persistent hypertension and was started on amlodipine.  A referral was sent to Internal Medicine so patient could establish care and have hospital follow-up.  On discharge  day she was seen and examined.  Home health was ordered to monitor nephrostomy.  Follow up with Urology has been made.  She will be discharged home.  All questions and concerns were addressed, including instruction on glucometer and diabetic diet.     Goals of Care Treatment Preferences:  Code Status: Full Code      SDOH Screening:  The patient was screened for utility difficulties, food insecurity, transport difficulties, housing insecurity, and interpersonal safety and there were no concerns identified this admission.     Consults:   Consults (From admission, onward)          Status Ordering Provider     Inpatient consult to Registered Dietitian/Nutritionist  Once        Provider:  (Not yet assigned)    Completed PAULINO ADAME     Inpatient consult to Diabetes educator  Once        Provider:  (Not yet assigned)    Completed PAULINO ADAME     Inpatient consult to Interventional Radiology  Once        Provider:  Fito Candelario MD    Completed CAYDEN SHER     Inpatient consult to Urology  Once        Provider:  Jerardo Salazar DO    Completed JERARDO SALAZAR            Final Active Diagnoses:    Diagnosis Date Noted POA    PRINCIPAL PROBLEM:  Nephrolithiasis [N20.0] 05/13/2025 Yes    Elevated blood-pressure reading without diagnosis of hypertension [R03.0] 05/14/2025 Yes    Hyperglycemia [R73.9] 05/14/2025 Yes    Obstruction of left ureteropelvic junction (UPJ) due to stone [N20.1] 05/13/2025 Yes      Problems Resolved During this Admission:       Discharged Condition: good    Disposition: Home or Self Care    Follow Up:   Contact information for after-discharge care       Home Medical Care       [facility] Follow up on 5/19/2025.    Service: Home Health Services  Why: They will contact you for home healthcare appointments.  Contact information:  [address] [place] [address], [address] [address] [phone]                                 Patient Instructions:       Ambulatory referral/consult to Internal Medicine   Standing Status: Future   Referral Priority: Routine Referral Type: Consultation   Referral Reason: Specialty Services Required   Requested Specialty: Internal Medicine   Number of Visits Requested: 1     Ambulatory referral/consult to Endocrinology   Standing Status: Future   Referral Priority: Routine Referral Type: Consultation   Requested Specialty: Endocrinology   Number of Visits Requested: 1     Ambulatory referral/consult to Urology   Standing Status: Future   Referral Priority: Routine Referral Type: Consultation   Referral Reason: Specialty Services Required   Requested Specialty: Urology   Number of Visits Requested: 1     Ambulatory referral/consult to Outpatient Case Management   Referral Priority: Routine Referral Type: Consultation   Referral Reason: Specialty Services Required   Number of Visits Requested: 1     Diet diabetic     Notify your health care provider if you experience any of the following:  temperature >100.4     Notify your health care provider if you experience any of the following:  severe uncontrolled pain     Notify your health care provider if you experience any of the following:  redness, tenderness, or signs of infection (pain, swelling, redness, odor or green/yellow discharge around incision site)     Activity as tolerated       Significant Diagnostic Studies: N/A    Pending Diagnostic Studies:       None           Medications:  Reconciled Home Medications:      Medication List        START taking these medications      amLODIPine 2.5 MG tablet  Commonly known as: NORVASC  Take 1 tablet (2.5 mg total) by mouth once daily.  Start taking on: May 16, 2025     hydrOXYzine HCL 25 MG tablet  Commonly known as: ATARAX  Take 1 tablet (25 mg total) by mouth 3 (three) times daily as needed for Anxiety.     metFORMIN 500 MG ER 24hr tablet  Commonly known as: GLUCOPHAGE-XR  Take 1 tablet (500 mg total) by mouth daily with breakfast.      ONETOUCH DELICA PLUS LANCET 30 gauge Misc  Generic drug: lancets  Use 4 (four) times daily before meals and nightly.     ONETOUCH VERIO FLEX METER Misc  Generic drug: blood-glucose meter  Use as instructed     ONETOUCH VERIO TEST STRIPS Strp  Generic drug: blood sugar diagnostic  Use 4 (four) times daily before meals and nightly.              Indwelling Lines/Drains at time of discharge:   Lines/Drains/Airways       Drain  Duration                  Nephrostomy 05/14/25 1051 Right 8 Fr. 1 day                    Time spent on the discharge of patient: 60 minutes         Sara Barrett DNP  Department of Hospital Medicine  [facility] - Med Surg ([address])

## 2025-05-16 ENCOUNTER — TELEPHONE (OUTPATIENT)
Dept: UROLOGY | Facility: CLINIC | Age: 70
End: 2025-05-16
Payer: COMMERCIAL

## 2025-05-20 ENCOUNTER — PATIENT OUTREACH (OUTPATIENT)
Dept: ADMINISTRATIVE | Facility: HOSPITAL | Age: 70
End: 2025-05-20
Payer: COMMERCIAL

## 2025-05-20 ENCOUNTER — PATIENT OUTREACH (OUTPATIENT)
Dept: ADMINISTRATIVE | Facility: OTHER | Age: 70
End: 2025-05-20
Payer: COMMERCIAL

## 2025-05-21 ENCOUNTER — TELEPHONE (OUTPATIENT)
Dept: INTERNAL MEDICINE | Facility: CLINIC | Age: 70
End: 2025-05-21
Payer: COMMERCIAL

## 2025-05-21 ENCOUNTER — OFFICE VISIT (OUTPATIENT)
Dept: INTERNAL MEDICINE | Facility: CLINIC | Age: 70
End: 2025-05-21
Payer: COMMERCIAL

## 2025-05-21 VITALS
BODY MASS INDEX: 23.16 KG/M2 | HEART RATE: 98 BPM | DIASTOLIC BLOOD PRESSURE: 71 MMHG | HEIGHT: 60 IN | SYSTOLIC BLOOD PRESSURE: 136 MMHG | OXYGEN SATURATION: 99 % | WEIGHT: 117.94 LBS

## 2025-05-21 DIAGNOSIS — I10 BENIGN ESSENTIAL HTN: Primary | ICD-10-CM

## 2025-05-21 DIAGNOSIS — E11.9 TYPE 2 DIABETES MELLITUS WITHOUT COMPLICATION, WITHOUT LONG-TERM CURRENT USE OF INSULIN: ICD-10-CM

## 2025-05-21 DIAGNOSIS — E11.65 UNCONTROLLED TYPE 2 DIABETES MELLITUS WITH HYPERGLYCEMIA: ICD-10-CM

## 2025-05-21 DIAGNOSIS — N13.30 HYDRONEPHROSIS, UNSPECIFIED HYDRONEPHROSIS TYPE: ICD-10-CM

## 2025-05-21 PROCEDURE — 99204 OFFICE O/P NEW MOD 45 MIN: CPT | Mod: S$GLB,,, | Performed by: STUDENT IN AN ORGANIZED HEALTH CARE EDUCATION/TRAINING PROGRAM

## 2025-05-21 PROCEDURE — 3075F SYST BP GE 130 - 139MM HG: CPT | Mod: CPTII,S$GLB,, | Performed by: STUDENT IN AN ORGANIZED HEALTH CARE EDUCATION/TRAINING PROGRAM

## 2025-05-21 PROCEDURE — 1125F AMNT PAIN NOTED PAIN PRSNT: CPT | Mod: CPTII,S$GLB,, | Performed by: STUDENT IN AN ORGANIZED HEALTH CARE EDUCATION/TRAINING PROGRAM

## 2025-05-21 PROCEDURE — 1159F MED LIST DOCD IN RCRD: CPT | Mod: CPTII,S$GLB,, | Performed by: STUDENT IN AN ORGANIZED HEALTH CARE EDUCATION/TRAINING PROGRAM

## 2025-05-21 PROCEDURE — 3008F BODY MASS INDEX DOCD: CPT | Mod: CPTII,S$GLB,, | Performed by: STUDENT IN AN ORGANIZED HEALTH CARE EDUCATION/TRAINING PROGRAM

## 2025-05-21 PROCEDURE — 3078F DIAST BP <80 MM HG: CPT | Mod: CPTII,S$GLB,, | Performed by: STUDENT IN AN ORGANIZED HEALTH CARE EDUCATION/TRAINING PROGRAM

## 2025-05-21 PROCEDURE — 1101F PT FALLS ASSESS-DOCD LE1/YR: CPT | Mod: CPTII,S$GLB,, | Performed by: STUDENT IN AN ORGANIZED HEALTH CARE EDUCATION/TRAINING PROGRAM

## 2025-05-21 PROCEDURE — 3046F HEMOGLOBIN A1C LEVEL >9.0%: CPT | Mod: CPTII,S$GLB,, | Performed by: STUDENT IN AN ORGANIZED HEALTH CARE EDUCATION/TRAINING PROGRAM

## 2025-05-21 PROCEDURE — 3288F FALL RISK ASSESSMENT DOCD: CPT | Mod: CPTII,S$GLB,, | Performed by: STUDENT IN AN ORGANIZED HEALTH CARE EDUCATION/TRAINING PROGRAM

## 2025-05-21 PROCEDURE — 99999 PR PBB SHADOW E&M-EST. PATIENT-LVL III: CPT | Mod: PBBFAC,,, | Performed by: STUDENT IN AN ORGANIZED HEALTH CARE EDUCATION/TRAINING PROGRAM

## 2025-05-21 RX ORDER — AMLODIPINE BESYLATE 2.5 MG/1
2.5 TABLET ORAL DAILY
Qty: 90 TABLET | Refills: 3 | Status: SHIPPED | OUTPATIENT
Start: 2025-05-21 | End: 2026-05-21

## 2025-05-21 RX ORDER — METFORMIN HYDROCHLORIDE 500 MG/1
1000 TABLET, EXTENDED RELEASE ORAL 2 TIMES DAILY WITH MEALS
Qty: 360 TABLET | Refills: 3 | Status: SHIPPED | OUTPATIENT
Start: 2025-05-21 | End: 2026-05-21

## 2025-05-21 RX ORDER — ORAL SEMAGLUTIDE 7 MG/1
7 TABLET ORAL DAILY
Qty: 30 TABLET | Refills: 11 | Status: SHIPPED | OUTPATIENT
Start: 2025-05-21 | End: 2026-05-21

## 2025-05-21 RX ORDER — BLOOD-GLUCOSE SENSOR
1 EACH MISCELLANEOUS
Qty: 3 EACH | Refills: 11 | Status: SHIPPED | OUTPATIENT
Start: 2025-05-21 | End: 2026-05-21

## 2025-05-21 RX ORDER — ORAL SEMAGLUTIDE 3 MG/1
3 TABLET ORAL DAILY
Qty: 30 TABLET | Refills: 0 | Status: SHIPPED | OUTPATIENT
Start: 2025-05-21 | End: 2025-06-20

## 2025-05-21 NOTE — PROGRESS NOTES
[facility]    Subjective:    Patient ID: Kylah Shearer is a 69 y.o. female.    History of Present Illness    CHIEF COMPLAINT:  Patient presents today for hospital follow-up after recent hospitalization for kidney stones and newly diagnosed diabetes    KIDNEY STONES:  She has multiple kidney stones bilaterally, with the left side being addressed first due to a large 1 cm stone. This required a 3-day hospitalization after she experienced acute kidney stone pain, at which time a colleague immediately transported her to the hospital.    DIABETES:  She was recently diagnosed with type 2 diabetes with an initial A1C of 9.3 and started on Metformin. Family history is significant for diabetes in both parents, with mother having complications including diabetic shock, coma, and stroke due to insulin non-compliance.    FATIGUE:  She reports new onset severe fatigue with significant activity intolerance. She becomes exhausted after minimal activities such as showering or walking around the block. This represents a notable decline from her previous ability to run with her grandsons.    GI CONCERNS:  She has a history of hiatal hernia and acid reflux. She reports constipation since hospital discharge with only one bowel movement since that time.    SOCIAL HISTORY:  She works as an , which involves being on her feet throughout the day. Her work schedule impacts her eating habits, often skipping lunch and delaying meals until dinner time while snacking on unhealthy foods throughout the day.       Current Outpatient Medications   Medication Instructions    amLODIPine (NORVASC) 2.5 mg, Oral, Daily    blood sugar diagnostic Strp Use 4 (four) times daily before meals and nightly.    blood-glucose meter Misc Use as instructed    blood-glucose sensor (DEXCOM G7 SENSOR) Patricia 1 each, Misc.(Non-Drug; Combo Route), Every 14 days    lancets (ONETOUCH DELICA PLUS LANCET) 30 gauge Misc Use 4  (four) times daily before meals and nightly.    metFORMIN (GLUCOPHAGE-XR) 1,000 mg, Oral, 2 times daily with meals    RYBELSUS 3 mg, Oral, Daily    RYBELSUS 7 mg, Oral, Daily       Objective:      Body mass index is 23.03 kg/m².  Vitals:    05/21/25 1012   BP: 136/71   Pulse: 98   SpO2: 99%   Weight: 53.5 kg (117 lb 15.1 oz)   Height: 5' (1.524 m)   PainSc:   5   PainLoc: Back     Physical Exam   Physical Exam    General: No acute distress. Normal appearance.  Head: Normocephalic.  Eyes: Extraocular movements intact.  Neck: No thyromegaly.  Cardiovascular: Normal rate and rhythm. No murmur heard.  Lungs: Pulmonary effort is normal. Normal breath sounds. No wheezing. No rales.  Abdomen: Flat.  Musculoskeletal: No edema lower extremities.  Skin: Warm. Dry.  Neurological: Alert.  Psychiatric: Normal mood. Normal behavior.       Assessment:      1. Benign essential HTN    2. Hydronephrosis, unspecified hydronephrosis type    3. Type 2 diabetes mellitus without complication, without long-term current use of insulin    4. Uncontrolled type 2 diabetes mellitus with hyperglycemia             Plan (dictated):            Plan continued (generated by ambient listening):  Assessment & Plan    Patient presents today for a hospital follow-up visit and recently diagnosed type 2 diabetes.  She was recently hospitalized for staghorn calculi requiring nephrostomy tube.  She has a visit with urology to plan percutaneous nephrolithotomy.  She was recently diagnosed with type 2 diabetes, and her A1C was above 9  She has since been watching her diet by reducing carbohydrate intake.  She is adherent to the metformin which was started in the hospital.  We discussed that the most effective medication to treat diabetes would be tirzepatide. However, she does not wish to be on an injectable medication due to a fear of needles.  Given this, we will try to achieve treatment with a combination of high maximum dose metformin and oral semaglutide  and diet modification.  Patient will continue to monitor her blood sugar.  We will see if we can get her a Dexcom to aid in monitoring. This would also allow rapid titration of medications by GMI rather than having to wait for Q3 month A1C.  She has a hiatal hernia seen on CT.  Reports some acid reflux.  Discussed with patient, advised OTC PPI p.r.n..  Can refer to GI if symptoms worsen.  Blood pressure is controlled on 2.5 mg of amlodipine which I will continue.  Hopefully her fatigue should improve as she recovers from her hospitalization.      Diagnoses and associated orders:   Benign essential HTN  -     amLODIPine (NORVASC) 2.5 MG tablet; Take 1 tablet (2.5 mg total) by mouth once daily.  Dispense: 90 tablet; Refill: 3    Hydronephrosis, unspecified hydronephrosis type  -     Ambulatory referral/consult to Internal Medicine    Type 2 diabetes mellitus without complication, without long-term current use of insulin  -     metFORMIN (GLUCOPHAGE-XR) 500 MG ER 24hr tablet; Take 2 tablets (1,000 mg total) by mouth 2 (two) times daily with meals.  Dispense: 360 tablet; Refill: 3  -     semaglutide (RYBELSUS) 3 mg tablet; Take 1 tablet (3 mg total) by mouth once daily.  Dispense: 30 tablet; Refill: 0  -     semaglutide (RYBELSUS) 7 mg tablet; Take 1 tablet (7 mg total) by mouth once daily.  Dispense: 30 tablet; Refill: 11    Uncontrolled type 2 diabetes mellitus with hyperglycemia  -     blood-glucose sensor (DEXCOM G7 SENSOR) Patricia; 1 each by Misc.(Non-Drug; Combo Route) route every 14 (fourteen) days.  Dispense: 3 each; Refill: 11        Tests to Keep You Healthy    Mammogram: DUE  Eye Exam: DUE  Colon Cancer Screening: DUE  Last Blood Pressure <= 139/89 (5/21/2025): Yes  Last HbA1c < 8 (05/14/2025): NO    Follow up in about 2 weeks (around 6/4/2025). or sooner prn (as needed)          Declan Mayo  [facility]  [address]  [address]  [place] [address]  Phone [phone]  Fax  [phone]    Part of this note is dictated using the [employer] Fluency Direct word recognition program. It may contain word recognition mistakes or wrong word substitutions (commonly he/she and is/was substitutions) that were missed on review.    Part of this note was generated with the assistance of ambient listening technology. Verbal consent was obtained by the patient and accompanying visitor(s) for the recording of patient appointment to facilitate this note. I attest to having reviewed and edited the generated note for accuracy, though some syntax or spelling errors may persist. Please contact the author of this note for any clarification.  I spent a total of 45 minutes on the day of the visit.  This includes face to face time and non-face to face time preparing to see the patient (eg, review of tests), obtaining and/or reviewing separately obtained history, documenting clinical information in the electronic or other health record, independently interpreting results and communicating results to the patient/family/caregiver, or care coordinator.

## 2025-05-21 NOTE — PROGRESS NOTES
LPN spoke to patient/caregiver as per OPCM referral for: Rx assistance.    Does the patient consent to completing the assessment/enrollment: Yes  Does the patient consent for LPN to speak to a caregiver? No    Health Insurance Coverage:     Does the patient have adequate health insurance coverage? Yes  Education provided: Yes    PCP Follow-Up Appointments:    Does the patient have a primary care provider? yes - Declan Mayo MD  Date of last PCP appointment?   Next PCP appointment: 5/21/25 initial visit  Was patient provided with education surrounding PCP services/creating a medical home? yes -       Specialist Appointments:     Does the patient have a pending specialist referral? no  Does the patient have an upcoming specialist appointment? yes - endo, urology, DM  Is the patient pregnant? No  Does the patient have a mental health provider? no       Home Medications:     Reviewed medication list with patient? No  Is the patient able to afford their medications? Yes      Recent lab results:  Blood Sugar:    Provided education: Yes  Blood Pressure:   Provided education: No        Social Determinants of Health (SDOH)    Patient's identified areas of need:      Education/Resources provided:          Home Health/DME:    Current Home Health: No  Patient has all healthcare equipment/supplies indicated: yes    Additional Documentation:   Spoke to patient based on OPCM referral. She denies any issues affording meds. She might switch to medicare this month. She has an initial visit with new pcp on 5/21/25, states will attend. Upcoming appts with urology and DM, and endo outside of the system. She states endo appt not until June, and will talk to new pcp about if he can get her in somewhere sooner. Denies any issues with food/utility/transp/housing. Staying with daughter who helps her make better diet choices. Will f/u in 1 week to check needs.       Follow up:   Patient agrees to scheduled follow up call.

## 2025-05-23 ENCOUNTER — TELEPHONE (OUTPATIENT)
Dept: ADMINISTRATIVE | Facility: OTHER | Age: 70
End: 2025-05-23
Payer: COMMERCIAL

## 2025-05-23 ENCOUNTER — TELEPHONE (OUTPATIENT)
Dept: INTERNAL MEDICINE | Facility: CLINIC | Age: 70
End: 2025-05-23
Payer: COMMERCIAL

## 2025-05-23 DIAGNOSIS — N20.0 NEPHROLITHIASIS: Primary | ICD-10-CM

## 2025-05-23 NOTE — TELEPHONE ENCOUNTER
----- Message from Nurse Meza sent at 5/23/2025  9:59 AM CDT -----    ----- Message -----  From: Kiera Telles  Sent: 5/23/2025   9:19 AM CDT  To: Yony Martinez Staff    Type : Patient CallWho Called : Patient Does the patient know what this is regarding?: PT says she had surgery on 05/14 by provider and she has a upcoming np appt scheduled on 05/29. PT says everything was okay until last night. She experienced some pain. Pt says the pain felt as if she had kidney stones. Pain was at a 10. Pt wanted to know if this was a normal thing to occur. Would the patient rather a call back or a response via [facility]? Patient Best Call Back Number: [phone] Information:

## 2025-05-23 NOTE — TELEPHONE ENCOUNTER
----- Message from Med Assistant Brigitte sent at 5/23/2025  2:56 PM CDT -----  Name of Who is Calling:ELIANA MANUEL [[id number]]  What is the request in detail: Pt states the other night she had really bad kidney stone pain and wants to know if she can get something called in that's stronger than regula Tylenol. Please assist.  Can the clinic reply by [facility]: No  What Number to Call Back if not in [facility]: [phone]

## 2025-05-24 RX ORDER — OXYCODONE AND ACETAMINOPHEN 5; 325 MG/1; MG/1
1 TABLET ORAL EVERY 4 HOURS PRN
Qty: 15 TABLET | Refills: 0 | Status: SHIPPED | OUTPATIENT
Start: 2025-05-24 | End: 2025-06-08

## 2025-05-24 NOTE — TELEPHONE ENCOUNTER
Nephrolithiasis  -     oxyCODONE-acetaminophen (PERCOCET) 5-325 mg per tablet; Take 1 tablet by mouth every 4 (four) hours as needed for Pain.  Dispense: 15 tablet; Refill: 0

## 2025-05-26 ENCOUNTER — PATIENT OUTREACH (OUTPATIENT)
Dept: ADMINISTRATIVE | Facility: OTHER | Age: 70
End: 2025-05-26
Payer: COMMERCIAL

## 2025-05-26 ENCOUNTER — TELEPHONE (OUTPATIENT)
Dept: UROLOGY | Facility: CLINIC | Age: 70
End: 2025-05-26
Payer: COMMERCIAL

## 2025-05-26 NOTE — TELEPHONE ENCOUNTER
Staff called pt to reschedule appt with . Pt was informed of appt time/date/location. Pt also asked staff about kidney stone pain and if she should be worried about stone wandering and causing damage to kidney. Pt was informed that staff will ask  and reach back out with an answer, pt confirmed and voiced understanding.

## 2025-05-26 NOTE — PROGRESS NOTES
----- Message from Moriah Vences NP sent at 4/10/2017  9:53 AM CDT -----  Normal mammogram, repeat 1 year, SBE monthly   [facility] - Case Closure    This LPN spoke to patient via telephone today.   Pt/Caregiver reported: Pt states visit with new pcp went well, has upcoming follow up. Dexcom was prescribed but denied by insurance. Discussed importance of monitoring BS regularly and taking meds as prescribed. Asked to call in future if needs.   Pt/Caregiver denied any additional needs at this time and agrees with episode closure at this time.  Provided patient with Community Health Worker's contact information and encouraged him/her to contact this Community Health Worker if additional needs arise.

## 2025-05-27 ENCOUNTER — TELEPHONE (OUTPATIENT)
Dept: INTERNAL MEDICINE | Facility: CLINIC | Age: 70
End: 2025-05-27
Payer: COMMERCIAL

## 2025-05-27 DIAGNOSIS — N20.0 NEPHROLITHIASIS: Primary | ICD-10-CM

## 2025-05-27 RX ORDER — ACETAMINOPHEN 500 MG
1000 TABLET ORAL EVERY 6 HOURS PRN
Qty: 30 TABLET | Refills: 0 | Status: SHIPPED | OUTPATIENT
Start: 2025-05-27

## 2025-05-27 NOTE — TELEPHONE ENCOUNTER
Spoke to patient, I read to her Dr. Sharma's message about her meds also forwarded the information to her so that she can have it handy if she forget how she should take her meds

## 2025-05-27 NOTE — TELEPHONE ENCOUNTER
Patient increased Metformin to 2 pills twice daily on 5/22/25 the very next day she started with nausea and vomiting she could not keep anything down not even water. She even experienced acid reflux. Yesterday she decreased the Metformin to 1 daily and she did not have the vomiting. She's wanting to know what to do because she knows she need to get her blood sugar under control and is thinking decreasing the med will not help but she can not take 2 pills twice daily of Metformin. Also Dr. Mayo sent in Oxycodone for her to take for kidney stones, she did not  the Oxycodone and would like to get Tylenol called in to take instead, she said Dr. Mayo sent in Oxycodone for her because her Urologist would not help her they just told her to go to the ED.

## 2025-05-27 NOTE — TELEPHONE ENCOUNTER
----- Message from Med Assistant Mariam sent at 5/27/2025  9:22 AM CDT -----  Type:  Needs Medical Advice/Symptom-based CallWho Called:Pt Symptoms (please be specific):Vomiting , unable to hold down food . Thinks due to metFORMIN (GLUCOPHAGE-XR) 500 MG ER 24hr tablet dosage . How long has patient had these symptoms:  Since increase Pharmacy:[facility]/pharm[facility] #[id number] - [place] - [id number] [facility] [place] [address]Jtwao: [phone] Fax: [phone] Would the patient rather a call back or a response via [facility]?Callback Best Call Back Number:Telephone Information:Mobile          [phone] Additional Information: She reduced the dose back down to 1

## 2025-05-27 NOTE — TELEPHONE ENCOUNTER
Please let her know that I sent in Tylenol 500mg to take 2 tabs up to four times a day as needed for pain. Insurance may not pay for it as it's over-the-counter also.  It may not be strong enough for the pain.  If it's not controlling her pain, she can take the oxycodone that Dr. Mayo prescribed. It is ok to do so. Please take it with food as it can cause nausea if taken on an empty stomach.    Regarding the metformin, please have her try taking 1 pill of metformin daily for one week and if tolerating, increase to 1 pill twice daily for another week. If tolerating that, try taking 1 pill in the morning and 2 pills in the evening (with dinner) for a week or two. If tolerating that, increase finally to 2 pills twice a day. Hopefully this gradual increase will be better tolerated.

## 2025-05-28 DIAGNOSIS — Z78.0 MENOPAUSE: ICD-10-CM

## 2025-05-28 DIAGNOSIS — E11.9 TYPE 2 DIABETES MELLITUS WITHOUT COMPLICATION, UNSPECIFIED WHETHER LONG TERM INSULIN USE: ICD-10-CM

## 2025-05-28 DIAGNOSIS — E11.9 TYPE 2 DIABETES MELLITUS WITHOUT COMPLICATION: ICD-10-CM

## 2025-05-28 DIAGNOSIS — Z12.31 OTHER SCREENING MAMMOGRAM: ICD-10-CM

## (undated) DEVICE — FASTNER CATH PERC DRAINAGE

## (undated) DEVICE — BAG DRAINAGE DISP LF 600ML

## (undated) DEVICE — INTRODUCER ACCUSTICK RO MARKER

## (undated) DEVICE — KIT PROBE COVER WITH GEL

## (undated) DEVICE — OMNIPAQUE CONTRAST 300MG/50ML

## (undated) DEVICE — SUT SILK 2.0 BLK 18

## (undated) DEVICE — TRAY ANGIO BAPTIST

## (undated) DEVICE — GUIDEWIRE AMPLATZ 75CM .035IN

## (undated) DEVICE — CATH SKATER 8FR 25CM